# Patient Record
Sex: MALE | Race: WHITE
[De-identification: names, ages, dates, MRNs, and addresses within clinical notes are randomized per-mention and may not be internally consistent; named-entity substitution may affect disease eponyms.]

---

## 2018-01-27 ENCOUNTER — HOSPITAL ENCOUNTER (EMERGENCY)
Dept: HOSPITAL 72 - EMR | Age: 68
LOS: 1 days | Discharge: HOME | End: 2018-01-28
Payer: MEDICARE

## 2018-01-27 VITALS — SYSTOLIC BLOOD PRESSURE: 95 MMHG | DIASTOLIC BLOOD PRESSURE: 49 MMHG

## 2018-01-27 VITALS — WEIGHT: 203 LBS | BODY MASS INDEX: 26.05 KG/M2 | HEIGHT: 74 IN

## 2018-01-27 VITALS — DIASTOLIC BLOOD PRESSURE: 52 MMHG | SYSTOLIC BLOOD PRESSURE: 95 MMHG

## 2018-01-27 DIAGNOSIS — R19.7: ICD-10-CM

## 2018-01-27 DIAGNOSIS — J44.9: ICD-10-CM

## 2018-01-27 DIAGNOSIS — E11.9: ICD-10-CM

## 2018-01-27 DIAGNOSIS — G40.909: ICD-10-CM

## 2018-01-27 DIAGNOSIS — I10: ICD-10-CM

## 2018-01-27 DIAGNOSIS — R50.9: Primary | ICD-10-CM

## 2018-01-27 LAB
ADD MANUAL DIFF: YES
ALBUMIN SERPL-MCNC: 3.1 G/DL (ref 3.4–5)
ALBUMIN/GLOB SERPL: 1 {RATIO} (ref 1–2.7)
ALP SERPL-CCNC: 49 U/L (ref 46–116)
ALT SERPL-CCNC: 27 U/L (ref 12–78)
ANION GAP SERPL CALC-SCNC: 7 MMOL/L (ref 5–15)
APPEARANCE UR: CLEAR
APTT PPP: YELLOW S
AST SERPL-CCNC: 26 U/L (ref 15–37)
BILIRUB SERPL-MCNC: 0.5 MG/DL (ref 0.2–1)
BUN SERPL-MCNC: 29 MG/DL (ref 7–18)
CALCIUM SERPL-MCNC: 8 MG/DL (ref 8.5–10.1)
CHLORIDE SERPL-SCNC: 106 MMOL/L (ref 98–107)
CO2 SERPL-SCNC: 28 MMOL/L (ref 21–32)
CREAT SERPL-MCNC: 1.3 MG/DL (ref 0.55–1.3)
ERYTHROCYTE [DISTWIDTH] IN BLOOD BY AUTOMATED COUNT: 12.3 % (ref 11.6–14.8)
GLOBULIN SER-MCNC: 3 G/DL
GLUCOSE UR STRIP-MCNC: NEGATIVE MG/DL
HCT VFR BLD CALC: 37.2 % (ref 42–52)
HGB BLD-MCNC: 12.4 G/DL (ref 14.2–18)
KETONES UR QL STRIP: (no result)
LEUKOCYTE ESTERASE UR QL STRIP: (no result)
MCV RBC AUTO: 93 FL (ref 80–99)
NITRITE UR QL STRIP: NEGATIVE
PH UR STRIP: 5 [PH] (ref 4.5–8)
PLATELET # BLD: 95 K/UL (ref 150–450)
POTASSIUM SERPL-SCNC: 3.5 MMOL/L (ref 3.5–5.1)
PROT UR QL STRIP: (no result)
RBC # BLD AUTO: 4 M/UL (ref 4.7–6.1)
SODIUM SERPL-SCNC: 141 MMOL/L (ref 136–145)
SP GR UR STRIP: 1.01 (ref 1–1.03)
UROBILINOGEN UR-MCNC: NORMAL MG/DL (ref 0–1)
WBC # BLD AUTO: 3.2 K/UL (ref 4.8–10.8)

## 2018-01-27 PROCEDURE — 86710 INFLUENZA VIRUS ANTIBODY: CPT

## 2018-01-27 PROCEDURE — 80053 COMPREHEN METABOLIC PANEL: CPT

## 2018-01-27 PROCEDURE — 36415 COLL VENOUS BLD VENIPUNCTURE: CPT

## 2018-01-27 PROCEDURE — 99283 EMERGENCY DEPT VISIT LOW MDM: CPT

## 2018-01-27 PROCEDURE — 85007 BL SMEAR W/DIFF WBC COUNT: CPT

## 2018-01-27 PROCEDURE — 71045 X-RAY EXAM CHEST 1 VIEW: CPT

## 2018-01-27 PROCEDURE — 85025 COMPLETE CBC W/AUTO DIFF WBC: CPT

## 2018-01-27 PROCEDURE — 81003 URINALYSIS AUTO W/O SCOPE: CPT

## 2018-01-27 NOTE — EMERGENCY ROOM REPORT
History of Present Illness


General


Chief Complaint:  Fever


Source:  Patient, Medical Record





Present Illness


HPI


67-year-old male sent from nursing home for episode of fever today, was given 

Tylenol prior to arrival.


Patient himself denies complaints


Endorses 2 days of diarrhea, twice a day, last episode was 5 hours ago.


no Blood in diarrhea, no recent antibiotic use


No history of diverticulitis or colitis.


No prior surgical history.


Also endorses dry cough intermittent for one week, no history of asthma or COPD.


Allergies:  


Coded Allergies:  


     No Known Allergies (Unverified , 1/27/18)





Patient History


Past Medical History:  DM, HTN


Past Surgical History:  none


Pertinent Family History:  none


Social History:  Denies: smoking, alcohol use, drug use


Immunizations:  UTD


Reviewed Nursing Documentation:  PMH: Agreed, PSxH: Agreed





Nursing Documentation-PMH


Past Medical History:  No History, Except For


Hx Hypertension:  Yes


Hx COPD:  Yes


Hx Diabetes:  Yes - dm2


Hx Seizures:  Yes - epilepsy





Review of Systems


All Other Systems:  negative except mentioned in HPI





Physical Exam





Vital Signs








  Date Time  Temp Pulse Resp B/P (MAP) Pulse Ox O2 Delivery O2 Flow Rate FiO2


 


1/27/18 22:04 99.3 90 16 84/50 96 Room Air  








Sp02 EP Interpretation:  reviewed, normal


General Appearance:  normal inspection, well appearing, no apparent distress, 

alert, GCS 15, non-toxic, other - Very well-appearing, reading Bible in 

stretcher


Head:  normocephalic, atraumatic


Eyes:  bilateral eye PERRL, bilateral eye EOMI


ENT:  normal ENT inspection, hearing grossly normal, normal pharynx, no 

angioedema, normal voice, TMs + canals normal, uvula midline, moist mucus 

membranes


Neck:  normal inspection, full range of motion, supple, thyroid normal, no 

meningismus, no bony tend


Respiratory:  normal inspection, lungs clear, normal breath sounds, no rhonchi, 

no respiratory distress, no retraction, no accessory muscle use, no wheezing, 

speaking full sentences


Cardiovascular #1:  regular rate, rhythm, no edema, no JVD, normal capillary 

refill


Gastrointestinal:  normal inspection, normal bowel sounds, non tender, soft, no 

mass, no peritonitis, non-distended, no guarding, no hernia, no pulsatile mass


Genitourinary:  no CVA tenderness


Musculoskeletal:  normal inspection, back normal, normal range of motion, no 

calf tenderness, pelvis stable, Miguelito's Sign negative


Neurologic:  normal inspection, alert, oriented x3, responsive, CNs III-XII nml 

as tested, motor strength/tone normal, cerebellar normal, normal gait, speech 

normal


Psychiatric:  normal inspection, judgement/insight normal, mood/affect normal, 

no suicidal/homicidal ideation, no delusions


Skin:  normal inspection, normal color, no rash


Lymphatic:  normal inspection, no adenopathy





Medical Decision Making


ER Course


VSS, very well appearing


Influenza negative


Chest x-ray negative for pneumonia


labs unremarkable


No additional episodes of diarrhea in the ER


No abdominal pain, nonfocal abdomen on serial exam


urinalysis also negative for infection





Will discharge back to [facility]








ER course:


Patient has remained stable during ED stay.





Disposition:





Patient is to be discharged to home.


Patient is instructed to follow up with their primary care doctor within 1-2 

days. 


Strict return precautions discussed with patient such as fever, chills, 

worsening/severe pain, nausea, vomiting, which may indicate severe illness. 

Patient verbalizes understanding and agrees with plan. 





Please note that this Emergency Department Report was dictated using [employer] technology software, occasionally this can lead to 

erroneous entry secondary to interpretation by the dictation equipment


Rhythm Strip Diag. Results


EP Interpretation:  yes


Rate:  77


Rhythm:  NSR, no PVC's, no ectopy





Chest X-Ray Diagnostic Results


Chest X-Ray Diagnostic Results :  


   Chest X-Ray Ordered:  Yes


   # of Views/Limited/Complete:  1 View


   Indication:  Other - cough


   EP Interpretation:  Yes


   Interpretation:  no consolidation, no effusion, no pneumothorax, no acute 

cardiopulmonary disease


   Impression:  No acute disease


   Electronically Signed by:  Dr Irasema Grissom MD





Last Vital Signs








  Date Time  Temp Pulse Resp B/P (MAP) Pulse Ox O2 Delivery O2 Flow Rate FiO2


 


1/27/18 22:31 98.4 78 16 95/52 96 Room Air  








Status:  improved


Disposition:  ASSISTED LIVING











IRASEMA GRISSOM M.D. Jan 27, 2018 22:46

## 2018-01-28 VITALS — SYSTOLIC BLOOD PRESSURE: 99 MMHG | DIASTOLIC BLOOD PRESSURE: 57 MMHG

## 2018-01-28 NOTE — DIAGNOSTIC IMAGING REPORT
Indication: Dyspnea

 

Technique: XRAY Chest 1v

 

Comparison: None

 

Findings: Heart size and mediastinal contours are within normal limits given

technique. There is no focal consolidation, pneumothorax or pleural effusion. Osseous

structures demonstrate no acute abnormality.

 

Impression: No radiographic evidence of acute cardiopulmonary disease.

## 2018-11-05 ENCOUNTER — HOSPITAL ENCOUNTER (INPATIENT)
Dept: HOSPITAL 72 - EMR | Age: 68
LOS: 7 days | Discharge: NURSING, REHAB OR LONG-TERM CARE | DRG: 417 | End: 2018-11-12
Payer: MEDICARE

## 2018-11-05 VITALS — DIASTOLIC BLOOD PRESSURE: 70 MMHG | SYSTOLIC BLOOD PRESSURE: 159 MMHG

## 2018-11-05 VITALS — WEIGHT: 204 LBS | BODY MASS INDEX: 26.18 KG/M2 | HEIGHT: 74 IN

## 2018-11-05 VITALS — SYSTOLIC BLOOD PRESSURE: 124 MMHG | DIASTOLIC BLOOD PRESSURE: 62 MMHG

## 2018-11-05 VITALS — SYSTOLIC BLOOD PRESSURE: 139 MMHG | DIASTOLIC BLOOD PRESSURE: 75 MMHG

## 2018-11-05 VITALS — SYSTOLIC BLOOD PRESSURE: 147 MMHG | DIASTOLIC BLOOD PRESSURE: 57 MMHG

## 2018-11-05 DIAGNOSIS — K29.51: ICD-10-CM

## 2018-11-05 DIAGNOSIS — R62.7: ICD-10-CM

## 2018-11-05 DIAGNOSIS — I25.10: ICD-10-CM

## 2018-11-05 DIAGNOSIS — G20: ICD-10-CM

## 2018-11-05 DIAGNOSIS — J44.9: ICD-10-CM

## 2018-11-05 DIAGNOSIS — D61.818: ICD-10-CM

## 2018-11-05 DIAGNOSIS — K81.0: Primary | ICD-10-CM

## 2018-11-05 DIAGNOSIS — E11.40: ICD-10-CM

## 2018-11-05 DIAGNOSIS — K44.9: ICD-10-CM

## 2018-11-05 DIAGNOSIS — F25.0: ICD-10-CM

## 2018-11-05 DIAGNOSIS — K91.5: ICD-10-CM

## 2018-11-05 DIAGNOSIS — I10: ICD-10-CM

## 2018-11-05 LAB
ADD MANUAL DIFF: YES
ALBUMIN SERPL-MCNC: 3.9 G/DL (ref 3.4–5)
ALBUMIN/GLOB SERPL: 1 {RATIO} (ref 1–2.7)
ALP SERPL-CCNC: 159 U/L (ref 46–116)
ALT SERPL-CCNC: 465 U/L (ref 12–78)
ANION GAP SERPL CALC-SCNC: 13 MMOL/L (ref 5–15)
APPEARANCE UR: CLEAR
APTT BLD: 25 SEC (ref 23–33)
APTT PPP: (no result) S
AST SERPL-CCNC: 202 U/L (ref 15–37)
BILIRUB SERPL-MCNC: 0.6 MG/DL (ref 0.2–1)
BUN SERPL-MCNC: 21 MG/DL (ref 7–18)
CALCIUM SERPL-MCNC: 9.6 MG/DL (ref 8.5–10.1)
CHLORIDE SERPL-SCNC: 103 MMOL/L (ref 98–107)
CK SERPL-CCNC: 142 U/L (ref 26–308)
CO2 SERPL-SCNC: 26 MMOL/L (ref 21–32)
CREAT SERPL-MCNC: 1.2 MG/DL (ref 0.55–1.3)
ERYTHROCYTE [DISTWIDTH] IN BLOOD BY AUTOMATED COUNT: 11 % (ref 11.6–14.8)
GLOBULIN SER-MCNC: 4.1 G/DL
GLUCOSE UR STRIP-MCNC: (no result) MG/DL
HCT VFR BLD CALC: 41.8 % (ref 42–52)
HGB BLD-MCNC: 14.8 G/DL (ref 14.2–18)
INR PPP: 1 (ref 0.9–1.1)
KETONES UR QL STRIP: (no result)
LEUKOCYTE ESTERASE UR QL STRIP: NEGATIVE
MCV RBC AUTO: 89 FL (ref 80–99)
NITRITE UR QL STRIP: NEGATIVE
PH UR STRIP: 9 [PH] (ref 4.5–8)
PLATELET # BLD: 159 K/UL (ref 150–450)
POTASSIUM SERPL-SCNC: 4.1 MMOL/L (ref 3.5–5.1)
PROT UR QL STRIP: (no result)
RBC # BLD AUTO: 4.69 M/UL (ref 4.7–6.1)
SODIUM SERPL-SCNC: 141 MMOL/L (ref 136–145)
SP GR UR STRIP: 1.01 (ref 1–1.03)
UROBILINOGEN UR-MCNC: NORMAL MG/DL (ref 0–1)
WBC # BLD AUTO: 7.8 K/UL (ref 4.8–10.8)

## 2018-11-05 PROCEDURE — 80053 COMPREHEN METABOLIC PANEL: CPT

## 2018-11-05 PROCEDURE — 85610 PROTHROMBIN TIME: CPT

## 2018-11-05 PROCEDURE — 82962 GLUCOSE BLOOD TEST: CPT

## 2018-11-05 PROCEDURE — 86803 HEPATITIS C AB TEST: CPT

## 2018-11-05 PROCEDURE — 86705 HEP B CORE ANTIBODY IGM: CPT

## 2018-11-05 PROCEDURE — 81003 URINALYSIS AUTO W/O SCOPE: CPT

## 2018-11-05 PROCEDURE — 83540 ASSAY OF IRON: CPT

## 2018-11-05 PROCEDURE — 76700 US EXAM ABDOM COMPLETE: CPT

## 2018-11-05 PROCEDURE — 74177 CT ABD & PELVIS W/CONTRAST: CPT

## 2018-11-05 PROCEDURE — 82550 ASSAY OF CK (CPK): CPT

## 2018-11-05 PROCEDURE — 83550 IRON BINDING TEST: CPT

## 2018-11-05 PROCEDURE — 96361 HYDRATE IV INFUSION ADD-ON: CPT

## 2018-11-05 PROCEDURE — 71045 X-RAY EXAM CHEST 1 VIEW: CPT

## 2018-11-05 PROCEDURE — 86901 BLOOD TYPING SEROLOGIC RH(D): CPT

## 2018-11-05 PROCEDURE — 36415 COLL VENOUS BLD VENIPUNCTURE: CPT

## 2018-11-05 PROCEDURE — 83690 ASSAY OF LIPASE: CPT

## 2018-11-05 PROCEDURE — 78266 GSTR EMPT IMG SM BWL&COLON: CPT

## 2018-11-05 PROCEDURE — 85025 COMPLETE CBC W/AUTO DIFF WBC: CPT

## 2018-11-05 PROCEDURE — 82607 VITAMIN B-12: CPT

## 2018-11-05 PROCEDURE — 94150 VITAL CAPACITY TEST: CPT

## 2018-11-05 PROCEDURE — 82728 ASSAY OF FERRITIN: CPT

## 2018-11-05 PROCEDURE — 84484 ASSAY OF TROPONIN QUANT: CPT

## 2018-11-05 PROCEDURE — 87340 HEPATITIS B SURFACE AG IA: CPT

## 2018-11-05 PROCEDURE — 82150 ASSAY OF AMYLASE: CPT

## 2018-11-05 PROCEDURE — 86900 BLOOD TYPING SEROLOGIC ABO: CPT

## 2018-11-05 PROCEDURE — 96375 TX/PRO/DX INJ NEW DRUG ADDON: CPT

## 2018-11-05 PROCEDURE — 84100 ASSAY OF PHOSPHORUS: CPT

## 2018-11-05 PROCEDURE — 82746 ASSAY OF FOLIC ACID SERUM: CPT

## 2018-11-05 PROCEDURE — 99285 EMERGENCY DEPT VISIT HI MDM: CPT

## 2018-11-05 PROCEDURE — 83735 ASSAY OF MAGNESIUM: CPT

## 2018-11-05 PROCEDURE — 80048 BASIC METABOLIC PNL TOTAL CA: CPT

## 2018-11-05 PROCEDURE — 87081 CULTURE SCREEN ONLY: CPT

## 2018-11-05 PROCEDURE — 97803 MED NUTRITION INDIV SUBSEQ: CPT

## 2018-11-05 PROCEDURE — 93005 ELECTROCARDIOGRAM TRACING: CPT

## 2018-11-05 PROCEDURE — 93970 EXTREMITY STUDY: CPT

## 2018-11-05 PROCEDURE — 94003 VENT MGMT INPAT SUBQ DAY: CPT

## 2018-11-05 PROCEDURE — 85007 BL SMEAR W/DIFF WBC COUNT: CPT

## 2018-11-05 PROCEDURE — 86709 HEPATITIS A IGM ANTIBODY: CPT

## 2018-11-05 PROCEDURE — 85730 THROMBOPLASTIN TIME PARTIAL: CPT

## 2018-11-05 PROCEDURE — 82270 OCCULT BLOOD FECES: CPT

## 2018-11-05 PROCEDURE — 87086 URINE CULTURE/COLONY COUNT: CPT

## 2018-11-05 PROCEDURE — 87040 BLOOD CULTURE FOR BACTERIA: CPT

## 2018-11-05 PROCEDURE — 86850 RBC ANTIBODY SCREEN: CPT

## 2018-11-05 PROCEDURE — 96374 THER/PROPH/DIAG INJ IV PUSH: CPT

## 2018-11-05 RX ADMIN — INSULIN ASPART SCH UNITS: 100 INJECTION, SOLUTION INTRAVENOUS; SUBCUTANEOUS at 17:15

## 2018-11-05 RX ADMIN — DIVALPROEX SODIUM SCH MG: 500 TABLET, FILM COATED, EXTENDED RELEASE ORAL at 20:27

## 2018-11-05 RX ADMIN — TAMSULOSIN HYDROCHLORIDE SCH MG: 0.4 CAPSULE ORAL at 20:27

## 2018-11-05 RX ADMIN — INSULIN ASPART SCH UNITS: 100 INJECTION, SOLUTION INTRAVENOUS; SUBCUTANEOUS at 20:29

## 2018-11-05 RX ADMIN — ZIPRASIDONE HYDROCHLORIDE SCH MG: 20 CAPSULE ORAL at 17:10

## 2018-11-05 RX ADMIN — DEXTROSE AND SODIUM CHLORIDE SCH MLS/HR: 5; .45 INJECTION, SOLUTION INTRAVENOUS at 15:19

## 2018-11-05 NOTE — EMERGENCY ROOM REPORT
History of Present Illness


General


Chief Complaint:  Abdominal Pain


Source:  Patient, Medical Record, EMS





Present Illness


HPI


Patient presents with vomiting and abdominal pain.  This is been worsening over 

the last 3 days.  He now points to his right lower quadrant with pain is worse 

right now he rates it 7/10 and achy pressure.  He's been vomiting dark material 

also and having difficulty keeping anything down.  





The patient's been moving his bowels without difficulty.  He denies any melena.





The patient has a history of either Parkinson's or neuropathy in the back of 

his brain with atrophy and tremors from taking psychiatric medication.  He has 

chronic weakness.





H/O seizures.  H/O diabetes.  H/O bipolar disorder





No cough, sore throat, rashes, joint pain, headache, anxiety.


Allergies:  


Coded Allergies:  


     No Known Allergies (Unverified , 1/27/18)





Patient History


Past Medical History:  see triage record


Social History:  Denies: smoking - former


Social History Narrative


assisted living


Reviewed Nursing Documentation:  PMH: Agreed; PSxH: Agreed





Nursing Documentation-PMH


Past Medical History:  No History, Except For


Hx Hypertension:  Yes


Hx COPD:  Yes


Hx Diabetes:  Yes


Hx Cancer:  No


Hx Gastrointestinal Problems:  No - gerd


Hx Neurological Problems:  Yes - PARKINSONS


Hx Seizures:  Yes





Review of Systems


All Other Systems:  negative except mentioned in HPI





Physical Exam





Vital Signs








  Date Time  Temp Pulse Resp B/P (MAP) Pulse Ox O2 Delivery O2 Flow Rate FiO2


 


11/5/18 09:16 98.2 73 22 162/70 97 Room Air  








Sp02 EP Interpretation:  reviewed, normal


General Appearance:  well appearing, no apparent distress, GCS 15


Head:  normocephalic


Eyes:  bilateral eye normal inspection, bilateral eye PERRL


ENT:  moist mucus membranes


Neck:  supple


Respiratory:  lungs clear, normal breath sounds


Cardiovascular #1:  regular rate, rhythm


Cardiovascular #2:  2+ radial (R)


Gastrointestinal:  normal inspection, normal bowel sounds, no mass, non-

distended, no guarding, no rebound, tenderness - RLQ


Rectal:  other - vomit Heme +


Musculoskeletal:  back normal, gait/station normal, normal range of motion


Neurologic:  alert, oriented x3, CNs III-XII nml as tested, motor strength/tone 

normal, DTRs symmetric, sensory intact, speech normal, other - tremor, thenar 

atrophy


Skin:  normal inspection, warm/dry





Medical Decision Making


Diagnostic Impression:  


 Primary Impression:  


 Abdominal pain


 Qualified Codes:  R10.31 - Right lower quadrant pain


 Additional Impressions:  


 UGI bleed


 Elevated LFTs


ER Course


Patient presents with 3 days of abdominal pain and vomiting.  His vomiting 

guaiac Positive material.  Differential includes gastritis, gastroenteritis 

appendicitis pancreatitis, peptic ulcer disease amongst others.  Evaluation 

will be with EKG, labs and CT the abdomen.  The patient will be treated with IV 

hydration, Pepcid, Zofran and morphine.





EKG with normal sinus rhythm normal EKG rate of 75 normal intervals.  WBC 

normal but L shift.  CMP with elevated LFTs.  UA with pyuria. 





Antibiotics begun.





CT with possible cholecystitis.  





Physical exam against cholecystitis at this time.  Pain free after analgesia.  

Still needs admission for further work up.





Admit med Dr. Henderson.





Laboratory Tests








Test


  11/5/18


09:57 11/5/18


10:50 11/6/18


05:55


 


White Blood Count


  7.8 K/UL


(4.8-10.8) 


  7.8 K/UL


(4.8-10.8)


 


Red Blood Count


  4.69 M/UL


(4.70-6.10)  L 


  4.27 M/UL


(4.70-6.10)  L


 


Hemoglobin


  14.8 G/DL


(14.2-18.0) 


  13.2 G/DL


(14.2-18.0)  L


 


Hematocrit


  41.8 %


(42.0-52.0)  L 


  38.3 %


(42.0-52.0)  L


 


Mean Corpuscular Volume 89 FL (80-99)    90 FL (80-99)  


 


Mean Corpuscular Hemoglobin


  31.5 PG


(27.0-31.0)  H 


  30.9 PG


(27.0-31.0)


 


Mean Corpuscular Hemoglobin


Concent 35.4 G/DL


(32.0-36.0) 


  34.3 G/DL


(32.0-36.0)


 


Red Cell Distribution Width


  11.0 %


(11.6-14.8)  L 


  11.4 %


(11.6-14.8)  L


 


Platelet Count


  159 K/UL


(150-450) 


  129 K/UL


(150-450)  L


 


Mean Platelet Volume


  6.5 FL


(6.5-10.1) 


  5.8 FL


(6.5-10.1)  L


 


Neutrophils (%) (Auto)


  % (45.0-75.0)


  


  76.7 %


(45.0-75.0)  H


 


Lymphocytes (%) (Auto)


  % (20.0-45.0)


  


  9.9 %


(20.0-45.0)  L


 


Monocytes (%) (Auto)


   % (1.0-10.0)  


  


  12.1 %


(1.0-10.0)  H


 


Eosinophils (%) (Auto)


   % (0.0-3.0)  


  


  0.6 %


(0.0-3.0)


 


Basophils (%) (Auto)


   % (0.0-2.0)  


  


  0.7 %


(0.0-2.0)


 


Differential Total Cells


Counted 100  


  


  


 


 


Neutrophils % (Manual) 92 % (45-75)  H  


 


Lymphocytes % (Manual) 2 % (20-45)  L  


 


Monocytes % (Manual) 6 % (1-10)    


 


Eosinophils % (Manual) 0 % (0-3)    


 


Basophils % (Manual) 0 % (0-2)    


 


Band Neutrophils 0 % (0-8)    


 


Platelet Estimate Adequate    


 


Platelet Morphology Normal    


 


Anisocytosis 1+    


 


Prothrombin Time


  10.1 SEC


(9.30-11.50) 


  10.1 SEC


(9.30-11.50)


 


Prothrombin Time INR 1.0 (0.9-1.1)    1.0 (0.9-1.1)  


 


PTT


  25 SEC (23-33)


  


  26 SEC (23-33)


 


 


Sodium Level


  141 MMOL/L


(136-145) 


  140 MMOL/L


(136-145)


 


Potassium Level


  4.1 MMOL/L


(3.5-5.1) 


  4.1 MMOL/L


(3.5-5.1)


 


Chloride Level


  103 MMOL/L


() 


  108 MMOL/L


()  H


 


Carbon Dioxide Level


  26 MMOL/L


(21-32) 


  29 MMOL/L


(21-32)


 


Anion Gap


  13 mmol/L


(5-15) 


  3 mmol/L


(5-15)  L


 


Blood Urea Nitrogen


  21 mg/dL


(7-18)  H 


  16 mg/dL


(7-18)


 


Creatinine


  1.2 MG/DL


(0.55-1.30) 


  0.9 MG/DL


(0.55-1.30)


 


Estimate Glomerular


Filtration Rate > 60 mL/min


(>60) 


  > 60 mL/min


(>60)


 


Glucose Level


  189 MG/DL


()  H 


  161 MG/DL


()  H


 


Calcium Level


  9.6 MG/DL


(8.5-10.1) 


  8.4 MG/DL


(8.5-10.1)  L


 


Total Bilirubin


  0.6 MG/DL


(0.2-1.0) 


  0.5 MG/DL


(0.2-1.0)


 


Aspartate Amino Transferase


(AST) 202 U/L


(15-37)  H 


  68 U/L (15-37)


H


 


Alanine Aminotransferase (ALT)


  465 U/L


(12-78)  H 


  248 U/L


(12-78)  H


 


Alkaline Phosphatase


  159 U/L


()  H 


  112 U/L


()


 


Total Creatine Kinase


  142 U/L


() 


  


 


 


Troponin I


  0.003 ng/mL


(0.000-0.056) 


  


 


 


Total Protein


  8.0 G/DL


(6.4-8.2) 


  6.9 G/DL


(6.4-8.2)


 


Albumin


  3.9 G/DL


(3.4-5.0) 


  3.1 G/DL


(3.4-5.0)  L


 


Globulin 4.1 g/dL    3.8 g/dL  


 


Albumin/Globulin Ratio


  1.0 (1.0-2.7)  


  


  0.8 (1.0-2.7)


L


 


Lipase


  109 U/L


() 


  75 U/L


()


 


Urine Color  Pale yellow   


 


Urine Appearance  Clear   


 


Urine pH  9 (4.5-8.0)   


 


Urine Specific Gravity


  


  1.015


(1.005-1.035) 


 


 


Urine Protein


  


  2+ (NEGATIVE)


H 


 


 


Urine Glucose (UA)


  


  1+ (NEGATIVE)


H 


 


 


Urine Ketones


  


  3+ (NEGATIVE)


H 


 


 


Urine Blood


  


  1+ (NEGATIVE)


H 


 


 


Urine Nitrite


  


  Negative


(NEGATIVE) 


 


 


Urine Bilirubin


  


  Negative


(NEGATIVE) 


 


 


Urine Urobilinogen


  


  Normal MG/DL


(0.0-1.0) 


 


 


Urine Leukocyte Esterase


  


  Negative


(NEGATIVE) 


 


 


Urine RBC


  


  2-4 /HPF (0 -


0)  H 


 


 


Urine WBC


  


  0-2 /HPF (0 -


0) 


 


 


Urine Squamous Epithelial


Cells 


  Occasional


/LPF 


 


 


Urine Amorphous Sediment


  


  Few /LPF


(NONE)  H 


 


 


Urine Bacteria


  


  Occasional


/HPF (NONE) 


 


 


Amylase Level


  


  


  45 U/L


()


 


Hepatitis A IgM Antibody   Pending  


 


Hepatitis B Surface Antigen   Pending  


 


Hepatitis B Core IgM Antibody   Pending  


 


Hepatitis C Antibody   Pending  








EKG Diagnostic Results


Rate:  normal


Rhythm:  NSR


ST Segments:  no acute changes





Rhythm Strip Diag. Results


EP Interpretation:  yes


Rhythm:  NSR, no PVC's, no ectopy





Chest X-Ray Diagnostic Results


Chest X-Ray Diagnostic Results :  


   Chest X-Ray Ordered:  Yes


   Indication:  Other


   EP Interpretation:  Yes


   Interpretation:  no consolidation, no effusion, no pneumothorax


   Impression:  No acute disease


   Electronically Signed by:  Devante Andrade MD





CT/MRI/US Diagnostic Results


CT/MRI/US Diagnostic Results :  


   Imaging Test Ordered:  abd pelvis


   Impression


Cholelithiasis. Cholecystitis not excluded. Correlate clinically


Mild perinephric stranding nonspecific.


Atherosclerotic disease.


Calcification within the liver near the dome nonspecific in nature.


Small right inguinal hernia containing fat.


L4 spondylolysis. Grade 1 spondylolisthesis L4 on 5.





Last Vital Signs








  Date Time  Temp Pulse Resp B/P (MAP) Pulse Ox O2 Delivery O2 Flow Rate FiO2


 


11/5/18 10:13 98.0 72 15 159/70 100 Room Air  








Status:  improved


Disposition:  ADMITTED AS INPATIENT


Condition:  Serious


Referrals:  


Jean-Pierre Henderson DO (PCP)











Devante Andrade MD Nov 5, 2018 10:25

## 2018-11-05 NOTE — DIAGNOSTIC IMAGING REPORT
Indication: Dyspnea

 

Comparison:  1/27/2018

 

A single view chest radiograph was obtained.

 

Findings:

 

Cardiomediastinal appearance is within normal limits for age. The lungs are clear.

Pulmonary vascularity is appropriate. The diaphragmatic contour is smooth and

costophrenic angles are sharp. No pleural effusions are identified. Degenerative

changes of the thoracic spine noted.

 

Impression: No acute findings

## 2018-11-05 NOTE — HISTORY AND PHYSICAL REPORT
DATE OF ADMISSION:  11/05/2018

TIME SEEN:  On 11/05/2018 at 1 p.m.



CONSULTANTS:

1. Kavya Joshi M.D.

2. Curtis Elizondo M.D.

3. Helen Lujan M.D.



CHIEF COMPLAINT:  Abdominal pain and GI bleed.



BRIEF HISTORY:  This is a 68-year-old male from [facility], who presents with abdominal pain and vomiting blood.  He came to

[place] ER, diagnosed with GI bleed, admitted to medical floor for further

treatment.  Currently, calm in bed, slightly confused, no complaint.



REVIEW OF SYSTEMS:  No chest pain.  No shortness of breath.  Slight nausea.

Slight vomiting.  No diarrhea.



PAST MEDICAL HISTORY:  Diabetes, schizoaffective disorder, COPD, weakness,

Parkinson's, failure to thrive.



PAST SURGICAL HISTORY:  Left arm.



ALLERGIES:  Denies.



MEDICATIONS:  Include Norvasc, Proscar, Cozaar, Januvia, Seroquel, Geodon,

NovoLog, morphine, temazepam, and diphenhydramine.



SOCIAL HISTORY:  No smoking.  No alcohol.  No intravenous drug

abuse.



FAMILY HISTORY:  Noncontributory.



PHYSICAL EXAMINATION:

GENERAL:  Calm in bed, oriented x2, in no acute distress.

VITAL SIGNS:  Temperature is 98 degrees, pulse 75, respirations 17, blood

pressure 147/57.

CARDIOVASCULAR:  No murmur.

LUNGS:  Distant and clear.

ABDOMEN:  Bowel sounds positive.  Nontender.  Nondistended.

EXTREMITIES:  No cyanosis or edema.

NEUROLOGIC:  The patient moves all extremities, slightly weak.



LABORATORY AND DIAGNOSTIC DATA:  Labs at this time show CBC is normal.  BMP

shows BUN 21, glucose 189, , , alkaline phosphatase 159.

Troponin 0.003.  INR is 1.0, PTT is 25.  Urinalysis, 1+ blood, 3+ ketone,

otherwise normal.



ASSESSMENT:  GI bleed, abdominal pain, hypertension, diabetes, COPD,

weakness, Parkinson's, schizoaffective, and failure to thrive.



PLAN:  NPO.  IV fluid.  OT, PT, dietary evaluation.  Resume home

medications.  CBC and BMP in the morning.  Blood pressure and blood sugar

control.









  ______________________________________________

  Jean-Pierre Henderson D.O.





DR:  Ron

D:  11/05/2018 13:32

T:  11/05/2018 19:49

JOB#:  [id number]

CC:

## 2018-11-05 NOTE — GI INITIAL CONSULT NOTE
History of Present Illness


General


Date patient seen:  Nov 5, 2018


Time patient seen:  15:58


Reason for Hospitalization:  Abdominal Pain


Referring physician:  FABIANO RODRIGUEZ


Reason for Consultation:  UGIB





Present Illness


HPI


Patient presents with vomiting and abdominal pain.  This is been worsening over 

the last 3 days.  He now points to his right lower quadrant with pain is worse 

right now he rates it 7/10 and achy pressure.  He's been vomiting dark material 

also and having difficulty keeping anything down.  


The patient's been moving his bowels without difficulty.  He denies any melena.


The patient has a history of either Parkinson's or neuropathy in the back of 

his brain with atrophy and tremors from taking psychiatric medication.


GI consulted for reported of UGIB.  Pt seen, awake A&Ox4 NAD with no active s/

sx of N/V/D or constipation at this time.  Pt reported severe vomiting and 

nausea last night reported dark contents.  The patient has no history of 

endoscopy nor colonoscopy.  He has been a diabetic for over 20 years.  Presents 

today with no anemia, however noted with transaminitis with elevated alkaline 

phosphatase.


Home Meds


Reported Medications


Quetiapine Fumarate* (QUETIAPINE FUMARATE*) 50 Mg Tablet, 50 MG ORAL BID, TAB


   11/5/18


Sitagliptin* (JANUVIA*) 25 Mg Tablet, 100 MG ORAL DAILY, TAB


   11/5/18


Al Hydroxide/mg Hydroxide (Mag-Al Liquid) 30 Ml Oral.susp, 30 ML ORAL Q6HR PRN 

for INDIGESTION, ML


   7/18/18


Ziprasidone Hcl* (GEODON*) 20 Mg Capsule, 20 MG ORAL TWICE A DAY, CAP 0 Refills


   7/15/18


Tamsulosin Hcl (TAMSULOSIN HCL*) 0.4 Mg Cap.er.24h, 0.4 MG ORAL BEDTIME, CAP


   7/15/18


Losartan Potassium* (LOSARTAN POTASSIUM*) 50 Mg Tablet, 50 MG ORAL DAILY, TAB


   7/15/18


Finasteride* (PROSCAR*) 5 Mg Tablet, 5 MG ORAL DAILY, TAB 0 Refills


   7/15/18


Divalproex Sodium* (DEPAKOTE ER*) 500 Mg Tab.er.24h, 500 MG ORAL EVERY 12 HOURS

, TAB


   7/15/18


Amlodipine Besylate* (AMLODIPINE BESYLATE*) 5 Mg Tablet, 5 MG ORAL DAILY, TAB


   7/15/18


Discontinued Reported Medications


Zolpidem Tartrate* (AMBIEN*) 5 Mg Tablet, 5 MG ORAL BEDTIME PRN for Insomnia, 

TAB


   7/18/18


Polyethylene Glycol 3350* (MIRALAX*) 17 Gm Powd.pack, 17 GM ORAL HS PRN for 

Constipation, PACKET


   7/18/18


Lorazepam* (ATIVAN*) 1 Mg Tablet, 1 MG ORAL BID PRN for For Anxiety, TAB


   7/18/18


Insulin Aspart (NOVOLOG) 100 Unit/1 Ml Vial, AC+HS


   7/18/18


Acetaminophen* (ACETAMINOPHEN 325MG TABLET*) 325 Mg Tablet, 650 MG ORAL Q4H PRN 

for Mild Pain/Temp > 100.5, TAB


   7/18/18


Propranolol Hcl* (INDERAL*) 10 Mg Tablet, 10 MG ORAL THREE TIMES A DAY, TAB 0 

Refills


   7/15/18


Sennosides (SENNA) 8.6 Mg Tablet, 8.6 MG PO BEDTIME, TAB


   7/15/18


Ascorbic Acid* (ASCORBIC ACID*) 500 Mg Tablet, 500 MG ORAL DAILY, TAB


   7/15/18


Sitagliptin (Januvia) 100 Mg Tablet, 100 MG ORAL DAILY, TAB


   7/15/18


Quetiapine Fumarate* (QUETIAPINE FUMARATE*) 50 Mg Tablet, 50 MG ORAL BID, TAB


   7/15/18


Omeprazole (OMEPRAZOLE) 40 Mg Capsule.dr, 40 MG ORAL DAILY, CAP


   7/15/18


Metformin Hcl* (METFORMIN HCL*) 500 Mg Tablet, 500 MG ORAL TWICE A DAY, TAB


   7/15/18


Lithium Carbonate* (LITHIUM*) 300 Mg Capsule, 300 MG ORAL EVERY 8 HOURS, CAP 0 

Refills


   7/15/18


Levetiracetam (LEVETIRACETAM) 750 Mg Tab.er.24h, 750 MG ORAL BID, TAB 0 Refills


   7/15/18


Escitalopram Oxalate (ESCITALOPRAM OXALATE*) 20 Mg Tablet, 20 MG ORAL DAILY, 

TAB 0 Refills


   7/15/18


Docusate Sodium* (COLACE*) 100 Mg Capsule, 250 MG ORAL BEDTIME, CAP


   7/15/18


Aspirin* (ASPIRIN*) 325 Mg Tablet, 325 MG ORAL DAILY, TAB


   7/15/18


Med list reviewed/reconciled:  Yes


Allergies:  


Coded Allergies:  


     No Known Allergies (Unverified , 1/27/18)





Patient History


History Provided By:  Patient, Medical Record


PMH Narrative


Past Medical History:  No History, Except For


Hx Hypertension:  Yes


Hx COPD:  Yes


Hx Diabetes:  Yes


Hx Cancer:  No


Hx Gastrointestinal Problems:  No - gerd


Hx Neurological Problems:  Yes - PARKINSON


Hx Seizures:  Yes


Pertinent Family History:  none


Social History:  Denies: smoking, alcohol use, drug use, other





Review of Systems


All Other Systems:  negative except mentioned in HPI





Physical Exam





Vital Signs








  Date Time  Temp Pulse Resp B/P (MAP) Pulse Ox O2 Delivery O2 Flow Rate FiO2


 


11/5/18 09:16 98.2 73 22 162/70 97 Room Air  








Sp02 EP Interpretation:  reviewed, normal


Labs





Laboratory Tests








Test


  11/5/18


09:57 11/5/18


10:50


 


White Blood Count


  7.8 K/UL


(4.8-10.8) 


 


 


Red Blood Count


  4.69 M/UL


(4.70-6.10)  L 


 


 


Hemoglobin


  14.8 G/DL


(14.2-18.0) 


 


 


Hematocrit


  41.8 %


(42.0-52.0)  L 


 


 


Mean Corpuscular Volume 89 FL (80-99)   


 


Mean Corpuscular Hemoglobin


  31.5 PG


(27.0-31.0)  H 


 


 


Mean Corpuscular Hemoglobin


Concent 35.4 G/DL


(32.0-36.0) 


 


 


Red Cell Distribution Width


  11.0 %


(11.6-14.8)  L 


 


 


Platelet Count


  159 K/UL


(150-450) 


 


 


Mean Platelet Volume


  6.5 FL


(6.5-10.1) 


 


 


Neutrophils (%) (Auto)


  % (45.0-75.0)


  


 


 


Lymphocytes (%) (Auto)


  % (20.0-45.0)


  


 


 


Monocytes (%) (Auto)  % (1.0-10.0)   


 


Eosinophils (%) (Auto)  % (0.0-3.0)   


 


Basophils (%) (Auto)  % (0.0-2.0)   


 


Differential Total Cells


Counted 100  


  


 


 


Neutrophils % (Manual) 92 % (45-75)  H 


 


Lymphocytes % (Manual) 2 % (20-45)  L 


 


Monocytes % (Manual) 6 % (1-10)   


 


Eosinophils % (Manual) 0 % (0-3)   


 


Basophils % (Manual) 0 % (0-2)   


 


Band Neutrophils 0 % (0-8)   


 


Platelet Estimate Adequate   


 


Platelet Morphology Normal   


 


Anisocytosis 1+   


 


Prothrombin Time


  10.1 SEC


(9.30-11.50) 


 


 


Prothromb Time International


Ratio 1.0 (0.9-1.1)  


  


 


 


Activated Partial


Thromboplast Time 25 SEC (23-33)


  


 


 


Sodium Level


  141 MMOL/L


(136-145) 


 


 


Potassium Level


  4.1 MMOL/L


(3.5-5.1) 


 


 


Chloride Level


  103 MMOL/L


() 


 


 


Carbon Dioxide Level


  26 MMOL/L


(21-32) 


 


 


Anion Gap


  13 mmol/L


(5-15) 


 


 


Blood Urea Nitrogen


  21 mg/dL


(7-18)  H 


 


 


Creatinine


  1.2 MG/DL


(0.55-1.30) 


 


 


Estimat Glomerular Filtration


Rate > 60 mL/min


(>60) 


 


 


Glucose Level


  189 MG/DL


()  H 


 


 


Calcium Level


  9.6 MG/DL


(8.5-10.1) 


 


 


Total Bilirubin


  0.6 MG/DL


(0.2-1.0) 


 


 


Aspartate Amino Transf


(AST/SGOT) 202 U/L


(15-37)  H 


 


 


Alanine Aminotransferase


(ALT/SGPT) 465 U/L


(12-78)  H 


 


 


Alkaline Phosphatase


  159 U/L


()  H 


 


 


Total Creatine Kinase


  142 U/L


() 


 


 


Troponin I


  0.003 ng/mL


(0.000-0.056) 


 


 


Total Protein


  8.0 G/DL


(6.4-8.2) 


 


 


Albumin


  3.9 G/DL


(3.4-5.0) 


 


 


Globulin 4.1 g/dL   


 


Albumin/Globulin Ratio 1.0 (1.0-2.7)   


 


Lipase


  109 U/L


() 


 


 


Urine Color  Pale yellow  


 


Urine Appearance  Clear  


 


Urine pH  9 (4.5-8.0)  


 


Urine Specific Gravity


  


  1.015


(1.005-1.035)


 


Urine Protein


  


  2+ (NEGATIVE)


H


 


Urine Glucose (UA)


  


  1+ (NEGATIVE)


H


 


Urine Ketones


  


  3+ (NEGATIVE)


H


 


Urine Blood


  


  1+ (NEGATIVE)


H


 


Urine Nitrite


  


  Negative


(NEGATIVE)


 


Urine Bilirubin


  


  Negative


(NEGATIVE)


 


Urine Urobilinogen


  


  Normal MG/DL


(0.0-1.0)


 


Urine Leukocyte Esterase


  


  Negative


(NEGATIVE)


 


Urine RBC


  


  2-4 /HPF (0 -


0)  H


 


Urine WBC


  


  0-2 /HPF (0 -


0)


 


Urine Squamous Epithelial


Cells 


  Occasional


/LPF


 


Urine Amorphous Sediment


  


  Few /LPF


(NONE)  H


 


Urine Bacteria


  


  Occasional


/HPF (NONE)








General Appearance:  well appearing, no apparent distress, alert


Head:  normocephalic


EENT:  PERRL/EOMI, normal ENT inspection


Neck:  supple


Respiratory:  normal breath sounds, no respiratory distress


Cardiovascular:  normal rate


Gastrointestinal:  normal inspection, non tender, soft, normal bowel sounds, non

-distended


Rectal:  deferred


Genitourinary:  deferred


Musculoskeletal:  normal inspection, back normal


Neurologic:  normal inspection, alert, oriented x3, responsive


Psychiatric:  normal inspection, judgement/insight normal, memory normal


Skin:  normal inspection, normal color, no rash, warm/dry, palpation normal, 

well hydrated


Lymphatic:  normal inspection, no adenopathy


Current Medications





Current Medications








 Medications


  (Trade)  Dose


 Ordered  Sig/Erasmo


 Route


 PRN Reason  Start Time


 Stop Time Status Last Admin


Dose Admin


 


 Acetaminophen


  (Tylenol)  650 mg  Q4H  PRN


 ORAL


 fever  11/5/18 13:00


 12/5/18 12:59   


 


 


 Al Hydroxide/Mg


 Hydroxide


  (Mylanta II)  30 ml  Q6H  PRN


 ORAL


 dyspepsia  11/5/18 13:00


 12/5/18 12:59   


 


 


 Amlodipine


 Besylate


  (Norvasc)  5 mg  DAILY


 ORAL


   11/6/18 09:00


 12/6/18 08:59   


 


 


 Barium Sulfate


  (Readi-Cat 2)  450 ml  NOW  PRN


 ORAL


 Radiology Procedure  11/5/18 10:00


 11/7/18 09:47   


 


 


 Dextrose


  (Dextrose 50%)  25 ml  Q30M  PRN


 IV


 Hypoglycemia  11/5/18 13:15


 12/5/18 13:06   


 


 


 Dextrose


  (Dextrose 50%)  50 ml  Q30M  PRN


 IV


 hypoglycemia  11/5/18 13:15


 12/5/18 13:14   


 


 


 Dextrose/Sodium


 Chloride  1,000 ml @ 


 75 mls/hr  [id number]


 IV


   11/5/18 13:04


 12/5/18 13:03  11/5/18 15:19


 


 


 Diphenhydramine


 HCl


  (Benadryl)  25 mg  Q6H  PRN


 ORAL


 Itching/Pruritis  11/5/18 13:00


 12/5/18 12:59   


 


 


 Divalproex Sodium


  (Depakote ER)  500 mg  EVERY 12  HOURS


 ORAL


   11/5/18 21:00


 12/5/18 20:59   


 


 


 Finasteride


  (Proscar)  5 mg  DAILY


 ORAL


   11/6/18 09:00


 12/6/18 08:59   


 


 


 Insulin Aspart


  (NovoLOG)    BEFORE MEALS AND  HS


 SUBQ


   11/5/18 16:30


 12/5/18 16:29   


 


 


 Iopamidol


  (Isovue-300


 100ml)  100 ml  NOW  PRN


 INJ


 Radiology Procedure  11/5/18 10:00


     


 


 


 Losartan Potassium


  (Cozaar)  50 mg  DAILY


 ORAL


   11/6/18 09:00


 12/6/18 08:59   


 


 


 Morphine Sulfate


  (Morphine


 Sulfate)  2 mg  Q4H  PRN


 IVP


 severe  Pain (Pain Scale 7-10)  11/5/18 13:00


 11/12/18 12:59   


 


 


 Nitroglycerin


  (Ntg)  0.4 mg  Q5M X 3 DOSES PRN


 SL


 Prn Chest Pain  11/5/18 13:00


 12/5/18 12:59   


 


 


 Ondansetron HCl


  (Zofran)  4 mg  Q6H  PRN


 IVP


 Nausea & Vomiting  11/5/18 13:00


 12/5/18 12:59   


 


 


 Polyethylene


 Glycol


  (Miralax)  17 gm  HSPRN  PRN


 ORAL


 Constipation  11/5/18 13:00


 12/5/18 12:59   


 


 


 Quetiapine


 Fumarate


  (SEROquel)  50 mg  BID


 ORAL


   11/5/18 18:00


 12/5/18 17:59   


 


 


 Sitagliptin


 Phosphate


  (Januvia)  100 mg  DAILY


 ORAL


   11/6/18 09:00


 12/6/18 08:59   


 


 


 Temazepam


  (Restoril)  15 mg  HSPRN  PRN


 ORAL


 Insomnia  11/5/18 13:00


 11/12/18 12:59   


 


 


 Ziprasidone


  (Geodon)  20 mg  TWICE A  DAY


 ORAL


   11/5/18 18:00


 12/5/18 17:59   


 











GI: Plan


Problems:  


(1) UGI bleed


(2) Abdominal pain


(3) Failure to thrive


(4) Weak


Plan


EGD scheduled tomorrow.


- NPO @ [facility].


- hold all blood thinners


prn transfusions


ppi


will follow with additional recs post procedure.


fu labs


needs outpatient colonoscopy





Discussed with Dr. Elizondo.


Thank you for this patient referral, we will follow.





The patient was seen and examined at bedside and all new and available data was 

reviewed in the patients chart. I agree with the above findings, impression 

and plan.  (Patient seen earlier today. Signature stamp does not reflect 

patient encounter time.). - MD Karen SmallSentara Alb[facility] Centeroi NP Nov 5, 2018 15:49

## 2018-11-05 NOTE — DIAGNOSTIC IMAGING REPORT
Indication:Abdominal pain

 

Technique: Grayscale and duplex Doppler imaging of the abdomen performed.

 

Comparison: None

 

Findings:

 

The liver is unremarkable. Shadowing focus calcification noted in the dome of the

liver. The gallbladder stone demonstrated. Sonographic Magaña's sign is negative per

technologist. CBD measures approximately 7 mm. The demonstrated part of the pancreas,

aorta and IVC show no abnormalities.   Both kidneys appear unremarkable.  The spleen

is normal in size. There is no biliary ductal dilatation identified. Doppler

evaluation of the main portal vein shows patency. There is no ascites. No

hydronephrosis seen.

 

Impression:

 

Cholelithiasis.

 

Liver calcification.

 

Left renal cyst

## 2018-11-05 NOTE — DIAGNOSTIC IMAGING REPORT
Indication: Abdominal pain

 

Technique: Continuous helical transaxial imaging of the abdomen and pelvis was

obtained from the lung bases to the pubic symphysis during intravenous contrast

administration. Coronal 2-D reformats were also obtained. Study obtained in a Siemens

sensation 64 slice CT.  Automatic Exposure Control was utilized.

 

Total Dose length Product (DLP):  920.32 mGycm

 

CT Dose Index Volume (CTDIvol):   16.14 mGy

 

Comparison: None

 

Findings: Lung bases are clear. Prominent gallstone demonstrated. There may be mild

gallbladder wall thickening. Correlate clinically for cholecystitis. Biliary ducts

are unremarkable. The pancreas, spleen and adrenal glands are unremarkable. There is

a peripheral focus of calcification within the liver which is otherwise unremarkable.

Perinephric stranding noted mild in degree nonspecific. Moderate aortoiliac

calcifications are present. No free fluid or free air identified. Urinary bladder is

mildly distended. There is a small right inguinal hernia containing fat. There is

narrowing of intervertebral discs and accompanying endplate osteophyte formation. 

Hypertrophied facet joints also demonstrated.. Mild anterolisthesis demonstrated at

L4-5. Bilateral pars interarticularis defects are demonstrated at L4.

 

IMPRESSION:

 

Cholelithiasis. Cholecystitis not excluded. Correlate clinically

 

Mild perinephric stranding nonspecific.

 

Atherosclerotic disease.

 

Calcification within the liver near the dome nonspecific in nature.

 

Small right inguinal hernia containing fat.

 

L4 spondylolysis. Grade 1 spondylolisthesis L4 on 5.

 

 

 

 

The CT scanner at [facility] is accredited by the American College of

Radiology and the scans are performed using dose optimization techniques as

appropriate to a performed exam including Automatic Exposure control.

## 2018-11-06 VITALS — DIASTOLIC BLOOD PRESSURE: 79 MMHG | SYSTOLIC BLOOD PRESSURE: 151 MMHG

## 2018-11-06 VITALS — DIASTOLIC BLOOD PRESSURE: 72 MMHG | SYSTOLIC BLOOD PRESSURE: 128 MMHG

## 2018-11-06 VITALS — SYSTOLIC BLOOD PRESSURE: 128 MMHG | DIASTOLIC BLOOD PRESSURE: 58 MMHG

## 2018-11-06 VITALS — DIASTOLIC BLOOD PRESSURE: 80 MMHG | SYSTOLIC BLOOD PRESSURE: 138 MMHG

## 2018-11-06 VITALS — DIASTOLIC BLOOD PRESSURE: 80 MMHG | SYSTOLIC BLOOD PRESSURE: 147 MMHG

## 2018-11-06 VITALS — SYSTOLIC BLOOD PRESSURE: 131 MMHG | DIASTOLIC BLOOD PRESSURE: 68 MMHG

## 2018-11-06 VITALS — DIASTOLIC BLOOD PRESSURE: 79 MMHG | SYSTOLIC BLOOD PRESSURE: 140 MMHG

## 2018-11-06 VITALS — SYSTOLIC BLOOD PRESSURE: 149 MMHG | DIASTOLIC BLOOD PRESSURE: 78 MMHG

## 2018-11-06 VITALS — DIASTOLIC BLOOD PRESSURE: 70 MMHG | SYSTOLIC BLOOD PRESSURE: 143 MMHG

## 2018-11-06 VITALS — SYSTOLIC BLOOD PRESSURE: 152 MMHG | DIASTOLIC BLOOD PRESSURE: 75 MMHG

## 2018-11-06 VITALS — SYSTOLIC BLOOD PRESSURE: 125 MMHG | DIASTOLIC BLOOD PRESSURE: 70 MMHG

## 2018-11-06 VITALS — SYSTOLIC BLOOD PRESSURE: 143 MMHG | DIASTOLIC BLOOD PRESSURE: 70 MMHG

## 2018-11-06 LAB
ADD MANUAL DIFF: NO
ALBUMIN SERPL-MCNC: 3.1 G/DL (ref 3.4–5)
ALBUMIN/GLOB SERPL: 0.8 {RATIO} (ref 1–2.7)
ALP SERPL-CCNC: 112 U/L (ref 46–116)
ALT SERPL-CCNC: 248 U/L (ref 12–78)
AMYLASE SERPL-CCNC: 45 U/L (ref 25–115)
ANION GAP SERPL CALC-SCNC: 3 MMOL/L (ref 5–15)
APTT BLD: 26 SEC (ref 23–33)
AST SERPL-CCNC: 68 U/L (ref 15–37)
BASOPHILS NFR BLD AUTO: 0.7 % (ref 0–2)
BILIRUB SERPL-MCNC: 0.5 MG/DL (ref 0.2–1)
BUN SERPL-MCNC: 16 MG/DL (ref 7–18)
CALCIUM SERPL-MCNC: 8.4 MG/DL (ref 8.5–10.1)
CHLORIDE SERPL-SCNC: 108 MMOL/L (ref 98–107)
CO2 SERPL-SCNC: 29 MMOL/L (ref 21–32)
CREAT SERPL-MCNC: 0.9 MG/DL (ref 0.55–1.3)
EOSINOPHIL NFR BLD AUTO: 0.6 % (ref 0–3)
ERYTHROCYTE [DISTWIDTH] IN BLOOD BY AUTOMATED COUNT: 11.4 % (ref 11.6–14.8)
GLOBULIN SER-MCNC: 3.8 G/DL
HCT VFR BLD CALC: 38.3 % (ref 42–52)
HGB BLD-MCNC: 13.2 G/DL (ref 14.2–18)
INR PPP: 1 (ref 0.9–1.1)
LYMPHOCYTES NFR BLD AUTO: 9.9 % (ref 20–45)
MCV RBC AUTO: 90 FL (ref 80–99)
MONOCYTES NFR BLD AUTO: 12.1 % (ref 1–10)
NEUTROPHILS NFR BLD AUTO: 76.7 % (ref 45–75)
PLATELET # BLD: 129 K/UL (ref 150–450)
POTASSIUM SERPL-SCNC: 4.1 MMOL/L (ref 3.5–5.1)
RBC # BLD AUTO: 4.27 M/UL (ref 4.7–6.1)
SODIUM SERPL-SCNC: 140 MMOL/L (ref 136–145)
WBC # BLD AUTO: 7.8 K/UL (ref 4.8–10.8)

## 2018-11-06 PROCEDURE — 0DB78ZX EXCISION OF STOMACH, PYLORUS, VIA NATURAL OR ARTIFICIAL OPENING ENDOSCOPIC, DIAGNOSTIC: ICD-10-PCS

## 2018-11-06 RX ADMIN — INSULIN ASPART SCH UNITS: 100 INJECTION, SOLUTION INTRAVENOUS; SUBCUTANEOUS at 20:24

## 2018-11-06 RX ADMIN — INSULIN ASPART SCH UNITS: 100 INJECTION, SOLUTION INTRAVENOUS; SUBCUTANEOUS at 05:53

## 2018-11-06 RX ADMIN — DIVALPROEX SODIUM SCH MG: 500 TABLET, FILM COATED, EXTENDED RELEASE ORAL at 20:23

## 2018-11-06 RX ADMIN — ZIPRASIDONE HYDROCHLORIDE SCH MG: 20 CAPSULE ORAL at 08:50

## 2018-11-06 RX ADMIN — INSULIN ASPART SCH UNITS: 100 INJECTION, SOLUTION INTRAVENOUS; SUBCUTANEOUS at 17:56

## 2018-11-06 RX ADMIN — DEXTROSE MONOHYDRATE SCH MLS/HR: 50 INJECTION, SOLUTION INTRAVENOUS at 17:42

## 2018-11-06 RX ADMIN — LOSARTAN POTASSIUM SCH MG: 50 TABLET, FILM COATED ORAL at 08:50

## 2018-11-06 RX ADMIN — ZIPRASIDONE HYDROCHLORIDE SCH MG: 20 CAPSULE ORAL at 17:42

## 2018-11-06 RX ADMIN — TAMSULOSIN HYDROCHLORIDE SCH MG: 0.4 CAPSULE ORAL at 20:23

## 2018-11-06 RX ADMIN — DIVALPROEX SODIUM SCH MG: 500 TABLET, FILM COATED, EXTENDED RELEASE ORAL at 08:50

## 2018-11-06 RX ADMIN — DEXTROSE AND SODIUM CHLORIDE SCH MLS/HR: 5; .45 INJECTION, SOLUTION INTRAVENOUS at 05:51

## 2018-11-06 RX ADMIN — DEXTROSE AND SODIUM CHLORIDE SCH MLS/HR: 5; .45 INJECTION, SOLUTION INTRAVENOUS at 15:44

## 2018-11-06 RX ADMIN — INSULIN ASPART SCH UNITS: 100 INJECTION, SOLUTION INTRAVENOUS; SUBCUTANEOUS at 11:20

## 2018-11-06 NOTE — GENERAL PROGRESS NOTE
Assessment/Plan


Problem List:  


(1) HTN (hypertension)


ICD Codes:  I10 - Essential (primary) hypertension


SNOMED:  00809102


(2) Diabetes


ICD Codes:  E11.9 - Type 2 diabetes mellitus without complications


SNOMED:  36585831


(3) COPD (chronic obstructive pulmonary disease)


ICD Codes:  J44.9 - Chronic obstructive pulmonary disease, unspecified


SNOMED:  57796674


(4) Psychiatric disorder


ICD Codes:  F99 - Mental disorder, not otherwise specified


SNOMED:  20266801, 645987443


(5) Parkinson disease


ICD Codes:  G20 - Parkinson's disease


SNOMED:  88161136


(6) UGI bleed


ICD Codes:  K92.2 - Gastrointestinal hemorrhage, unspecified


SNOMED:  45715856


(7) Weak


ICD Codes:  R53.1 - Weakness


SNOMED:  51876427


(8) Abdominal pain


ICD Codes:  R10.9 - Unspecified abdominal pain


SNOMED:  12273999


Status:  unchanged


Assessment/Plan


ot pt diet gi f/u cbc bmp am





Subjective


Constitutional:  Reports: weakness


Allergies:  


Coded Allergies:  


     No Known Allergies (Unverified , 1/27/18)


All Systems:  reviewed and negative except above


Subjective


calm in bed awating egd





Objective





Last 24 Hour Vital Signs








  Date Time  Temp Pulse Resp B/P (MAP) Pulse Ox O2 Delivery O2 Flow Rate FiO2


 


11/6/18 09:00      Room Air  


 


11/6/18 08:00 97.9 83 18 147/80 (102) 98   


 


11/6/18 04:00 98.4 72 21 131/68 (89) 95   


 


11/6/18 00:00 98.6 75 20 143/70 (94) 98   


 


11/5/18 21:00      Room Air  


 


11/5/18 20:00 98.7 85 19 124/62 (82) 96   


 


11/5/18 16:00 99.7 75 20 139/75 (96) 97   


 


11/5/18 13:11      Room Air  

















Intake and Output  


 


 11/5/18 11/6/18





 19:00 07:00


 


Intake Total  900 ml


 


Output Total 0 ml 


 


Balance 0 ml 900 ml


 


  


 


Intake IV Total  900 ml


 


Output Urine Total 0 ml 








Laboratory Tests


11/6/18 05:55: 


White Blood Count 7.8, Red Blood Count 4.27L, Hemoglobin 13.2L, Hematocrit 38.3L

, Mean Corpuscular Volume 90, Mean Corpuscular Hemoglobin 30.9, Mean 

Corpuscular Hemoglobin Concent 34.3, Red Cell Distribution Width 11.4L, 

Platelet Count 129L, Mean Platelet Volume 5.8L, Neutrophils (%) (Auto) 76.7H, 

Lymphocytes (%) (Auto) 9.9L, Monocytes (%) (Auto) 12.1H, Eosinophils (%) (Auto) 

0.6, Basophils (%) (Auto) 0.7, Prothrombin Time 10.1, Prothromb Time 

International Ratio 1.0, Activated Partial Thromboplast Time 26, Sodium Level 

140, Potassium Level 4.1, Chloride Level 108H, Carbon Dioxide Level 29, Anion 

Gap 3L, Blood Urea Nitrogen 16, Creatinine 0.9, Estimat Glomerular Filtration 

Rate > 60, Glucose Level 161H, Calcium Level 8.4L, Total Bilirubin 0.5, 

Aspartate Amino Transf (AST/SGOT) 68H, Alanine Aminotransferase (ALT/SGPT) 248H

, Alkaline Phosphatase 112, Total Protein 6.9, Albumin 3.1L, Globulin 3.8, 

Albumin/Globulin Ratio 0.8L, Amylase Level 45, Lipase 75, Hepatitis A IgM 

Antibody [Pending], Hepatitis B Surface Antigen [Pending], Hepatitis B Core IgM 

Antibody [Pending], Hepatitis C Antibody [Pending]


Height (Feet):  6


Height (Inches):  2.00


Weight (Pounds):  192


General Appearance:  lethargic


EENT:  normal ENT inspection


Neck:  normal alignment


Cardiovascular:  normal peripheral pulses, normal rate, regular rhythm


Respiratory/Chest:  chest wall non-tender, lungs clear, normal breath sounds


Abdomen:  normal bowel sounds, non tender, soft


Extremities:  normal inspection


Edema:  no edema noted Arm (L), no edema noted Arm (R), no edema noted Leg (L), 

no edema noted Leg (R), no edema noted Pedal (L), no edema noted Pedal (R), no 

edema noted Generalized


Neurologic:  motor weakness


Skin:  normal pigmentation, warm/dry











Jean-Pierre Henderson DO Nov 6, 2018 12:43

## 2018-11-06 NOTE — PRE-PROCEDURE NOTE/ATTESTATION
Pre-Procedure Note/Attestation


Complete Prior to Procedure


Planned Procedure:  not applicable


Procedure Narrative:


egd





Indications for Procedure


Pre-Operative Diagnosis:


gib





Attestation


I attest that I discussed the nature of the procedure; its benefits; risks and 

complications; and alternatives (and the risks and benefits of such alternatives

), prior to the procedure, with the patient (or the patient's legal 

representative).





I attest that, if there was a reasonable possibility of needing a blood 

transfusion, the patient (or the patient's legal representative) was given the 

[facility] standardized written summary, pursuant 

to the Dex Mckenna Blood Safety Act ([place] Health and Safety Code # 1645, as 

amended).





I attest that I re-evaluated the patient just prior to the surgery and that 

there has been no change in the patient's H&P, except as documented below:











Curtis Elizondo MD Nov 6, 2018 12:10

## 2018-11-06 NOTE — IMMEDIATE POST-OP EVALUATION
Immediate Post-Op Evalulation


Immediate Post-Op Evalulation


Procedure:  EGD with Bx


Date of Evaluation:  Nov 6, 2018


Time of Evaluation:  12:53


IV Fluids:  200


Blood Products:  none


Estimated Blood Loss:  none


Urinary Output:  none


Blood Pressure Systolic:  140


Blood Pressure Diastolic:  79


Pulse Rate:  76


Respiratory Rate:  20


O2 Sat by Pulse Oximetry:  99


Temperature (Fahrenheit):  98.8


Pain Score (1-10):  1


Nausea:  No


Vomiting:  No


Complications


none


Patient Status:  awake, patent, none


Hydration Status:  adequate











Luis Covington MD Nov 6, 2018 12:53

## 2018-11-06 NOTE — CARDIOLOGY REPORT
--------------- APPROVED REPORT --------------





EKG Measurement

Heart [id number]

[id number]

[id number]

[id number]

[id number]





Normal sinus rhythm

Normal ECG

## 2018-11-06 NOTE — 48 HOUR POST ANESTHESIA EVAL
Post Anesthesia Evaluation


Procedure:  EGD with Bx


Date of Evaluation:  Nov 6, 2018


Time of Evaluation:  13:51


Blood Pressure Systolic:  143


0:  68


Pulse Rate:  74


Respiratory Rate:  20


Temperature (Fahrenheit):  97.6


O2 Sat by Pulse Oximetry:  98


Airway:  patent


Nausea:  No


Vomiting:  No


Pain Intensity:  1


Hydration Status:  adequate


Cardiopulmonary Status:


stable


Mental Status/LOC:  patient returned to baseline


Follow-up Care/Observations:


n/a


Post-Anesthesia Complications:


none


Follow-up care needed:  N/A











Luis Covington MD Nov 6, 2018 13:52

## 2018-11-06 NOTE — ANETHESIA PREOPERATIVE EVAL
Anesthesia Pre-op PMH/ROS


General


Date of Evaluation:  Nov 6, 2018


Time of Evaluation:  12:14


Anesthesiologist:  Adria


ASA Score:  ASA 3


Mallampati Score


Class I : Soft palate, uvula, fauces, pillars visible


Class II: Soft palate, uvula, fauces visible


Class III: Soft palate, base of uvula visible


Class IV: Only hard plate visible


Mallampati Classification:  Class II


Surgeon:  Mikhail


Diagnosis:  Abdominal pain


Surgical Procedure:  EGD


Anesthesia History:  none


Family History:  no anesthesia problems


Allergies:  


Coded Allergies:  


     No Known Allergies (Unverified , 1/27/18)


Patient NPO?:  Yes





Past Medical History


Cardiovascular:  Reports: HTN; 


   Denies: CAD, MI, valve dz, arrhythmia, other


Pulmonary:  Reports: COPD; 


   Denies: asthma, KESHA, other


Gastrointestinal/Genitourinary:  Reports: GERD; 


   Denies: CRI, ESRD, other


Neurologic/Psychiatric:  Reports: other - Parkinsons, schizophrenia; 


   Denies: dementia, CVA, depression/anxiety, TIA


Endocrine:  Reports: DM; 


   Denies: hypothyroidism, steroids, other


HEENT:  Denies: cataract (L), cataract (R), glaucoma, [place] (L), [place] (R), other


Hematology/Immune:  Denies: anemia, DVT, bleeding disorder, other


Musculoskeletal/Integumentary:  Denies: OA, RA, DJD, DDD, edema, other


PMH Narrative:


as above


PSxH Narrative:


See H&P





Anesthesia Pre-op Phys. Exam


Physician Exam





Last Vital Signs








  Date Time  Temp Pulse Resp B/P (MAP) Pulse Ox O2 Delivery O2 Flow Rate FiO2


 


11/6/18 09:00      Room Air  


 


11/6/18 08:00 97.9 83 18 147/80 (102) 98   








Constitutional:  NAD


Neurologic:  other - unable to obtaine


Cardiovascular:  RRR, no M/R/G


Respiratory:  CTA


Gastrointestinal:  S/NT/ND





Airway Exam


Mallampati Score:  Class II


MO:  limited


Neck:  stiff


ROM:  limited


Teeth:  missing


Dentures:  no upper, no lower





Anesthesia Pre-op A/P


Labs





Hematology








Test


  11/6/18


05:55


 


White Blood Count


  7.8 K/UL


(4.8-10.8)


 


Red Blood Count


  4.27 M/UL


(4.70-6.10)  L


 


Hemoglobin


  13.2 G/DL


(14.2-18.0)  L


 


Hematocrit


  38.3 %


(42.0-52.0)  L


 


Mean Corpuscular Volume 90 FL (80-99)  


 


Mean Corpuscular Hemoglobin


  30.9 PG


(27.0-31.0)


 


Mean Corpuscular Hemoglobin


Concent 34.3 G/DL


(32.0-36.0)


 


Red Cell Distribution Width


  11.4 %


(11.6-14.8)  L


 


Platelet Count


  129 K/UL


(150-450)  L


 


Mean Platelet Volume


  5.8 FL


(6.5-10.1)  L


 


Neutrophils (%) (Auto)


  76.7 %


(45.0-75.0)  H


 


Lymphocytes (%) (Auto)


  9.9 %


(20.0-45.0)  L


 


Monocytes (%) (Auto)


  12.1 %


(1.0-10.0)  H


 


Eosinophils (%) (Auto)


  0.6 %


(0.0-3.0)


 


Basophils (%) (Auto)


  0.7 %


(0.0-2.0)








Coagulation








Test


  11/6/18


05:55


 


Prothrombin Time


  10.1 SEC


(9.30-11.50)


 


Prothromb Time International


Ratio 1.0 (0.9-1.1)  


 


 


Activated Partial


Thromboplast Time 26 SEC (23-33)


 








Chemistry








Test


  11/6/18


05:55


 


Sodium Level


  140 MMOL/L


(136-145)


 


Potassium Level


  4.1 MMOL/L


(3.5-5.1)


 


Chloride Level


  108 MMOL/L


()  H


 


Carbon Dioxide Level


  29 MMOL/L


(21-32)


 


Anion Gap


  3 mmol/L


(5-15)  L


 


Blood Urea Nitrogen


  16 mg/dL


(7-18)


 


Creatinine


  0.9 MG/DL


(0.55-1.30)


 


Estimat Glomerular Filtration


Rate > 60 mL/min


(>60)


 


Glucose Level


  161 MG/DL


()  H


 


Calcium Level


  8.4 MG/DL


(8.5-10.1)  L


 


Total Bilirubin


  0.5 MG/DL


(0.2-1.0)


 


Aspartate Amino Transf


(AST/SGOT) 68 U/L (15-37)


H


 


Alanine Aminotransferase


(ALT/SGPT) 248 U/L


(12-78)  H


 


Alkaline Phosphatase


  112 U/L


()


 


Total Protein


  6.9 G/DL


(6.4-8.2)


 


Albumin


  3.1 G/DL


(3.4-5.0)  L


 


Globulin 3.8 g/dL  


 


Albumin/Globulin Ratio


  0.8 (1.0-2.7)


L


 


Amylase Level


  45 U/L


()


 


Lipase


  75 U/L


()











Risk Assessment & Plan


Assessment:


ASA 3


Plan:


MAC


Status Change Before Surgery:  No





Pre-Antibiotics


Drug:  none











Luis Covington MD Nov 6, 2018 12:52

## 2018-11-06 NOTE — PROCEDURE NOTE
DATE OF PROCEDURE:  11/06/2018

SURGEON:  Curtis Elizondo M.D.



ANESTHESIOLOGIST:  Dr. Covington.



PROCEDURE:  Upper endoscopy with biopsy.



ANESTHESIA:  Per Dr. Covington.



INSTRUMENT:  Olympus adult flexible upper endoscope.



INDICATION:  Upper GI bleeding.



The procedure, risks, benefits, and possible consequences, including

hemorrhage, aspiration, perforation and infection, and alternative

treatments, were explained to the patient/legal guardian by Dr. Curtis Elizondo and the patient/legal guardian understood and accepted these

risks.



DESCRIPTION OF PROCEDURE:  After informed consent was obtained and the

patient was adequately sedated, Olympus upper endoscope was advanced from

the mouth into the second portion of duodenum and retroflexion was

performed in the stomach.



The patient had some irregular Z-line without any obvious significant

esophageal ulceration.  There was evidence of a small hiatal hernia.  In

the stomach, there was diffuse gastritis.  Random biopsies from antrum and

body was obtained to rule out H. pylori infection.  The rest of upper

endoscopic examination grossly looked within normal limits.  The patient

tolerated the procedure very well without any complication.



SUMMARY OF FINDINGS:

1. Irregular Z-line.

2. Small hiatal hernia.

3. Gastritis, status post biopsy.



RECOMMENDATIONS:

1. Follow up biopsy results and treat accordingly.

2. The patient will need outpatient colonoscopy.









  ______________________________________________

  Curtis Elizondo M.D.





DR:  Rogelio

D:  11/06/2018 13:18

T:  11/06/2018 16:14

JOB#:  [id number]

CC:

## 2018-11-06 NOTE — PULMONOLOGY PROGRESS NOTE
Assessment/Plan


Problems:  


(1) UGI bleed


(2) Abdominal pain


(3) COPD (chronic obstructive pulmonary disease)


(4) CAD (coronary artery disease)


(5) Psychiatric disorder


(6) Parkinson disease


(7) Failure to thrive


Assessment/Plan


NPO


endoscopy scheduled


iv fluids


check electrolytes


symptomatic treatment


respiratory treatment





Subjective


ROS Limited/Unobtainable:  No


Interval Events:  no new complains


Constitutional:  Reports: no symptoms


HEENT:  Repors: no symptoms


Respiratory:  Reports: no symptoms


Allergies:  


Coded Allergies:  


     No Known Allergies (Unverified , 1/27/18)





Objective





Last 24 Hour Vital Signs








  Date Time  Temp Pulse Resp B/P (MAP) Pulse Ox O2 Delivery O2 Flow Rate FiO2


 


11/6/18 09:00      Room Air  


 


11/6/18 08:00 97.9 83 18 147/80 (102) 98   


 


11/6/18 04:00 98.4 72 21 131/68 (89) 95   


 


11/6/18 00:00 98.6 75 20 143/70 (94) 98   


 


11/5/18 21:00      Room Air  


 


11/5/18 20:00 98.7 85 19 124/62 (82) 96   


 


11/5/18 16:00 99.7 75 20 139/75 (96) 97   


 


11/5/18 13:11      Room Air  


 


11/5/18 12:20 98.2 75 17 147/57 98 Room Air  


 


11/5/18 12:05 98.2 75 17 147/57 98 Room Air  

















Intake and Output  


 


 11/5/18 11/6/18





 19:00 07:00


 


Intake Total  900 ml


 


Output Total 0 ml 


 


Balance 0 ml 900 ml


 


  


 


Intake IV Total  900 ml


 


Output Urine Total 0 ml 








General Appearance:  WD/WN


HEENT:  normocephalic, atraumatic


Respiratory/Chest:  chest wall non-tender, lungs clear


Abdomen:  normal bowel sounds, no organomegaly


Extremities:  no cyanosis


Skin:  no lesions, no ulcers





Microbiology








 Date/Time


Source Procedure


Growth Status


 


 


 11/5/18 10:52


Rectum - Preliminary Resulted


 


 11/5/18 10:52


Rectum  Received








Laboratory Tests


11/5/18 10:50: 


Urine Color Pale yellow, Urine Appearance Clear, Urine pH 9, Urine Specific 

Gravity 1.015, Urine Protein 2+H, Urine Glucose (UA) 1+H, Urine Ketones 3+H, 

Urine Blood 1+H, Urine Nitrite Negative, Urine Bilirubin Negative, Urine 

Urobilinogen Normal, Urine Leukocyte Esterase Negative, Urine RBC 2-4H, Urine 

WBC 0-2, Urine Squamous Epithelial Cells Occasional, Urine Amorphous Sediment 

FewH, Urine Bacteria Occasional


11/6/18 05:55: 


White Blood Count 7.8, Red Blood Count 4.27L, Hemoglobin 13.2L, Hematocrit 38.3L

, Mean Corpuscular Volume 90, Mean Corpuscular Hemoglobin 30.9, Mean 

Corpuscular Hemoglobin Concent 34.3, Red Cell Distribution Width 11.4L, 

Platelet Count 129L, Mean Platelet Volume 5.8L, Neutrophils (%) (Auto) 76.7H, 

Lymphocytes (%) (Auto) 9.9L, Monocytes (%) (Auto) 12.1H, Eosinophils (%) (Auto) 

0.6, Basophils (%) (Auto) 0.7, Prothrombin Time 10.1, Prothromb Time 

International Ratio 1.0, Activated Partial Thromboplast Time 26, Sodium Level 

140, Potassium Level 4.1, Chloride Level 108H, Carbon Dioxide Level 29, Anion 

Gap 3L, Blood Urea Nitrogen 16, Creatinine 0.9, Estimat Glomerular Filtration 

Rate > 60, Glucose Level 161H, Calcium Level 8.4L, Total Bilirubin 0.5, 

Aspartate Amino Transf (AST/SGOT) 68H, Alanine Aminotransferase (ALT/SGPT) 248H

, Alkaline Phosphatase 112, Total Protein 6.9, Albumin 3.1L, Globulin 3.8, 

Albumin/Globulin Ratio 0.8L, Amylase Level 45, Lipase 75, Hepatitis A IgM 

Antibody [Pending], Hepatitis B Surface Antigen [Pending], Hepatitis B Core IgM 

Antibody [Pending], Hepatitis C Antibody [Pending]





Current Medications








 Medications


  (Trade)  Dose


 Ordered  Sig/Erasmo


 Route


 PRN Reason  Start Time


 Stop Time Status Last Admin


Dose Admin


 


 Acetaminophen


  (Tylenol)  650 mg  Q4H  PRN


 ORAL


 fever  11/5/18 13:00


 12/5/18 12:59   


 


 


 Al Hydroxide/Mg


 Hydroxide


  (Mylanta II)  30 ml  Q6H  PRN


 ORAL


 dyspepsia  11/5/18 13:00


 12/5/18 12:59   


 


 


 Amlodipine


 Besylate


  (Norvasc)  5 mg  DAILY


 ORAL


   11/6/18 09:00


 12/6/18 08:59   


 


 


 Dextrose


  (Dextrose 50%)  25 ml  Q30M  PRN


 IV


 Hypoglycemia  11/5/18 13:15


 12/5/18 13:06   


 


 


 Dextrose


  (Dextrose 50%)  50 ml  Q30M  PRN


 IV


 hypoglycemia  11/5/18 13:15


 12/5/18 13:14   


 


 


 Dextrose/Sodium


 Chloride  1,000 ml @ 


 75 mls/hr  [id number]


 IV


   11/5/18 13:04


 12/5/18 13:03  11/6/18 05:51


 


 


 Diphenhydramine


 HCl


  (Benadryl)  25 mg  Q6H  PRN


 ORAL


 Itching/Pruritis  11/5/18 13:00


 12/5/18 12:59   


 


 


 Divalproex Sodium


  (Depakote ER)  500 mg  EVERY 12  HOURS


 ORAL


   11/5/18 21:00


 12/5/18 20:59  11/5/18 20:27


 


 


 Finasteride


  (Proscar)  5 mg  DAILY


 ORAL


   11/6/18 09:00


 12/6/18 08:59   


 


 


 Insulin Aspart


  (NovoLOG)    BEFORE MEALS AND  HS


 SUBQ


   11/5/18 16:30


 12/5/18 16:29  11/6/18 05:53


 


 


 Losartan Potassium


  (Cozaar)  50 mg  DAILY


 ORAL


   11/6/18 09:00


 12/6/18 08:59   


 


 


 Morphine Sulfate


  (Morphine


 Sulfate)  2 mg  Q4H  PRN


 IVP


 severe  Pain (Pain Scale 7-10)  11/5/18 13:00


 11/12/18 12:59   


 


 


 Nitroglycerin


  (Ntg)  0.4 mg  Q5M X 3 DOSES PRN


 SL


 Prn Chest Pain  11/5/18 13:00


 12/5/18 12:59   


 


 


 Ondansetron HCl


  (Zofran)  4 mg  Q6H  PRN


 IVP


 Nausea & Vomiting  11/5/18 13:00


 12/5/18 12:59   


 


 


 Polyethylene


 Glycol


  (Miralax)  17 gm  HSPRN  PRN


 ORAL


 Constipation  11/5/18 13:00


 12/5/18 12:59   


 


 


 Quetiapine


 Fumarate


  (SEROquel)  50 mg  BID


 ORAL


   11/5/18 18:00


 12/5/18 17:59  11/5/18 17:11


 


 


 Sitagliptin


 Phosphate


  (Januvia)  100 mg  DAILY


 ORAL


   11/6/18 09:00


 12/6/18 08:59   


 


 


 Tamsulosin HCl


  (Flomax)  0.4 mg  BEDTIME


 ORAL


   11/5/18 21:00


 12/5/18 20:59  11/5/18 20:27


 


 


 Temazepam


  (Restoril)  15 mg  HSPRN  PRN


 ORAL


 Insomnia  11/5/18 13:00


 11/12/18 12:59   


 


 


 Ziprasidone


  (Geodon)  20 mg  TWICE A  DAY


 ORAL


   11/5/18 18:00


 12/5/18 17:59  11/5/18 17:10


 

















Kavya Joshi MD Nov 6, 2018 10:31

## 2018-11-07 VITALS — SYSTOLIC BLOOD PRESSURE: 130 MMHG | DIASTOLIC BLOOD PRESSURE: 69 MMHG

## 2018-11-07 VITALS — SYSTOLIC BLOOD PRESSURE: 136 MMHG | DIASTOLIC BLOOD PRESSURE: 66 MMHG

## 2018-11-07 VITALS — SYSTOLIC BLOOD PRESSURE: 110 MMHG | DIASTOLIC BLOOD PRESSURE: 53 MMHG

## 2018-11-07 VITALS — SYSTOLIC BLOOD PRESSURE: 135 MMHG | DIASTOLIC BLOOD PRESSURE: 63 MMHG

## 2018-11-07 VITALS — DIASTOLIC BLOOD PRESSURE: 50 MMHG | SYSTOLIC BLOOD PRESSURE: 114 MMHG

## 2018-11-07 LAB
ADD MANUAL DIFF: NO
ALBUMIN SERPL-MCNC: 2.7 G/DL (ref 3.4–5)
ALBUMIN/GLOB SERPL: 0.7 {RATIO} (ref 1–2.7)
ALP SERPL-CCNC: 94 U/L (ref 46–116)
ALT SERPL-CCNC: 162 U/L (ref 12–78)
ANION GAP SERPL CALC-SCNC: 8 MMOL/L (ref 5–15)
APTT BLD: 28 SEC (ref 23–33)
AST SERPL-CCNC: 32 U/L (ref 15–37)
BASOPHILS NFR BLD AUTO: 0.9 % (ref 0–2)
BILIRUB SERPL-MCNC: 0.5 MG/DL (ref 0.2–1)
BUN SERPL-MCNC: 13 MG/DL (ref 7–18)
CALCIUM SERPL-MCNC: 8.5 MG/DL (ref 8.5–10.1)
CHLORIDE SERPL-SCNC: 107 MMOL/L (ref 98–107)
CO2 SERPL-SCNC: 27 MMOL/L (ref 21–32)
CREAT SERPL-MCNC: 0.9 MG/DL (ref 0.55–1.3)
EOSINOPHIL NFR BLD AUTO: 0.7 % (ref 0–3)
ERYTHROCYTE [DISTWIDTH] IN BLOOD BY AUTOMATED COUNT: 11.2 % (ref 11.6–14.8)
GLOBULIN SER-MCNC: 4 G/DL
HCT VFR BLD CALC: 35.5 % (ref 42–52)
HGB BLD-MCNC: 12.6 G/DL (ref 14.2–18)
INR PPP: 0.9 (ref 0.9–1.1)
LYMPHOCYTES NFR BLD AUTO: 12.4 % (ref 20–45)
MCV RBC AUTO: 90 FL (ref 80–99)
MONOCYTES NFR BLD AUTO: 12.2 % (ref 1–10)
NEUTROPHILS NFR BLD AUTO: 73.8 % (ref 45–75)
PHOSPHATE SERPL-MCNC: 2.2 MG/DL (ref 2.5–4.9)
PLATELET # BLD: 121 K/UL (ref 150–450)
POTASSIUM SERPL-SCNC: 3.9 MMOL/L (ref 3.5–5.1)
RBC # BLD AUTO: 3.96 M/UL (ref 4.7–6.1)
SODIUM SERPL-SCNC: 142 MMOL/L (ref 136–145)
WBC # BLD AUTO: 8.3 K/UL (ref 4.8–10.8)

## 2018-11-07 RX ADMIN — DEXTROSE AND SODIUM CHLORIDE SCH MLS/HR: 5; .45 INJECTION, SOLUTION INTRAVENOUS at 17:17

## 2018-11-07 RX ADMIN — INSULIN ASPART SCH UNITS: 100 INJECTION, SOLUTION INTRAVENOUS; SUBCUTANEOUS at 20:30

## 2018-11-07 RX ADMIN — DIVALPROEX SODIUM SCH MG: 500 TABLET, FILM COATED, EXTENDED RELEASE ORAL at 20:28

## 2018-11-07 RX ADMIN — INSULIN ASPART SCH UNITS: 100 INJECTION, SOLUTION INTRAVENOUS; SUBCUTANEOUS at 12:38

## 2018-11-07 RX ADMIN — DEXTROSE AND SODIUM CHLORIDE SCH MLS/HR: 5; .45 INJECTION, SOLUTION INTRAVENOUS at 00:27

## 2018-11-07 RX ADMIN — DEXTROSE MONOHYDRATE SCH MLS/HR: 50 INJECTION, SOLUTION INTRAVENOUS at 06:23

## 2018-11-07 RX ADMIN — ZIPRASIDONE HYDROCHLORIDE SCH MG: 20 CAPSULE ORAL at 17:16

## 2018-11-07 RX ADMIN — INSULIN ASPART SCH UNITS: 100 INJECTION, SOLUTION INTRAVENOUS; SUBCUTANEOUS at 06:31

## 2018-11-07 RX ADMIN — INSULIN ASPART SCH UNITS: 100 INJECTION, SOLUTION INTRAVENOUS; SUBCUTANEOUS at 16:31

## 2018-11-07 RX ADMIN — ZIPRASIDONE HYDROCHLORIDE SCH MG: 20 CAPSULE ORAL at 09:00

## 2018-11-07 RX ADMIN — DEXTROSE MONOHYDRATE SCH MLS/HR: 50 INJECTION, SOLUTION INTRAVENOUS at 21:51

## 2018-11-07 RX ADMIN — DIVALPROEX SODIUM SCH MG: 500 TABLET, FILM COATED, EXTENDED RELEASE ORAL at 09:00

## 2018-11-07 RX ADMIN — DEXTROSE MONOHYDRATE SCH MLS/HR: 50 INJECTION, SOLUTION INTRAVENOUS at 14:48

## 2018-11-07 RX ADMIN — TAMSULOSIN HYDROCHLORIDE SCH MG: 0.4 CAPSULE ORAL at 20:28

## 2018-11-07 RX ADMIN — LOSARTAN POTASSIUM SCH MG: 50 TABLET, FILM COATED ORAL at 09:00

## 2018-11-07 NOTE — PULMONOLOGY PROGRESS NOTE
Assessment/Plan


Problems:  


(1) UGI bleed


(2) Abdominal pain


(3) COPD (chronic obstructive pulmonary disease)


(4) CAD (coronary artery disease)


(5) Psychiatric disorder


(6) Parkinson disease


(7) Failure to thrive


Assessment/Plan


no new complains


all reviewed


iv fluids


check electrolytes


symptomatic treatment


respiratory treatment


dc planning





Subjective


ROS Limited/Unobtainable:  No


Constitutional:  Reports: no symptoms


HEENT:  Repors: no symptoms


Respiratory:  Reports: no symptoms


Allergies:  


Coded Allergies:  


     No Known Allergies (Unverified , 1/27/18)





Objective





Last 24 Hour Vital Signs








  Date Time  Temp Pulse Resp B/P (MAP) Pulse Ox O2 Delivery O2 Flow Rate FiO2


 


11/7/18 12:00 97.9 72 20 136/66 (89)    


 


11/7/18 09:56 100.3       


 


11/7/18 09:00      Room Air  


 


11/7/18 08:00 100.3 78 20 135/63 (87)    


 


11/7/18 04:00 99.2 76 18 110/53 (72)    


 


11/7/18 00:00 98.0 79 19 130/69 (89) 98   


 


11/6/18 21:00      Room Air  


 


11/6/18 20:00 97.8 69 18 128/72 (90) 97   


 


11/6/18 18:13 98.8       


 


11/6/18 16:00 100.2 84 18 128/58 (81) 99   

















Intake and Output  


 


 11/6/18 11/7/18





 19:00 07:00


 


Intake Total 1212.5 ml 


 


Output Total 300 ml 800 ml


 


Balance 912.5 ml -800 ml


 


  


 


Intake Oral 360 ml 


 


IV Total 852.5 ml 


 


Output Urine Total 300 ml 800 ml


 


# Voids 1 








General Appearance:  WD/WN


HEENT:  normocephalic, atraumatic


Respiratory/Chest:  chest wall non-tender, lungs clear


Cardiovascular:  normal peripheral pulses, normal rate


Abdomen:  normal bowel sounds, soft, non tender


Genitourinary:  normal external genitalia


Extremities:  no clubbing


Neurologic/Psychiatric:  CNs II-XII grossly normal





Microbiology








 Date/Time


Source Procedure


Growth Status


 


 


 11/5/18 10:52


Nasal Nares MRSA Culture - Final


NO METHICILLIN RESISTANT STAPH AUREUS... Complete


 


 11/6/18 17:30


Urine,Clean Catch Urine Culture - Preliminary


NO GROWTH Resulted


 


 11/5/18 10:52


Rectum - Final


NO CARBAPENEM-RESISTANT ENTEROBACTERI... Complete


 


 11/5/18 10:52


Rectum VRE Culture - Final


NO VANCOMYCIN RESISTANT ENTEROCOCCUS ... Complete








Laboratory Tests


11/7/18 05:30: 


White Blood Count 8.3, Red Blood Count 3.96L, Hemoglobin 12.6L, Hematocrit 35.5L

, Mean Corpuscular Volume 90, Mean Corpuscular Hemoglobin 31.8H, Mean 

Corpuscular Hemoglobin Concent 35.4, Red Cell Distribution Width 11.2L, 

Platelet Count 121L, Mean Platelet Volume 6.1L, Neutrophils (%) (Auto) 73.8, 

Lymphocytes (%) (Auto) 12.4L, Monocytes (%) (Auto) 12.2H, Eosinophils (%) (Auto

) 0.7, Basophils (%) (Auto) 0.9, Prothrombin Time 10.0, Prothromb Time 

International Ratio 0.9, Activated Partial Thromboplast Time 28, Sodium Level 

142, Potassium Level 3.9, Chloride Level 107, Carbon Dioxide Level 27, Anion 

Gap 8, Blood Urea Nitrogen 13, Creatinine 0.9, Estimat Glomerular Filtration 

Rate > 60, Glucose Level 164H, Calcium Level 8.5, Phosphorus Level 2.2L, 

Magnesium Level 1.9, Total Bilirubin 0.5, Aspartate Amino Transf (AST/SGOT) 32, 

Alanine Aminotransferase (ALT/SGPT) 162H, Alkaline Phosphatase 94, Total 

Protein 6.7, Albumin 2.7L, Globulin 4.0, Albumin/Globulin Ratio 0.7L





Current Medications








 Medications


  (Trade)  Dose


 Ordered  Sig/Erasmo


 Route


 PRN Reason  Start Time


 Stop Time Status Last Admin


Dose Admin


 


 Acetaminophen


  (Tylenol)  650 mg  Q4H  PRN


 ORAL


 fever  11/5/18 13:00


 12/5/18 12:59  11/6/18 13:15


 


 


 Al Hydroxide/Mg


 Hydroxide


  (Mylanta II)  30 ml  Q6H  PRN


 ORAL


 dyspepsia  11/5/18 13:00


 12/5/18 12:59   


 


 


 Amlodipine


 Besylate


  (Norvasc)  5 mg  DAILY


 ORAL


   11/6/18 09:00


 12/6/18 08:59   


 


 


 Dextrose


  (Dextrose 50%)  25 ml  Q30M  PRN


 IV


 Hypoglycemia  11/5/18 13:15


 12/5/18 13:06   


 


 


 Dextrose


  (Dextrose 50%)  50 ml  Q30M  PRN


 IV


 hypoglycemia  11/5/18 13:15


 12/5/18 13:14   


 


 


 Dextrose/Sodium


 Chloride  1,000 ml @ 


 75 mls/hr  [id number]


 IV


   11/5/18 13:04


 12/5/18 13:03  11/7/18 00:27


 


 


 Diphenhydramine


 HCl


  (Benadryl)  25 mg  Q6H  PRN


 ORAL


 Itching/Pruritis  11/5/18 13:00


 12/5/18 12:59   


 


 


 Divalproex Sodium


  (Depakote ER)  500 mg  EVERY 12  HOURS


 ORAL


   11/5/18 21:00


 12/5/18 20:59  11/6/18 20:23


 


 


 Finasteride


  (Proscar)  5 mg  DAILY


 ORAL


   11/6/18 09:00


 12/6/18 08:59   


 


 


 Insulin Aspart


  (NovoLOG)    BEFORE MEALS AND  HS


 SUBQ


   11/5/18 16:30


 12/5/18 16:29  11/7/18 12:38


 


 


 Losartan Potassium


  (Cozaar)  50 mg  DAILY


 ORAL


   11/6/18 09:00


 12/6/18 08:59   


 


 


 Morphine Sulfate


  (Morphine


 Sulfate)  2 mg  ONCE  PRN


 IVP


 prior to HIDA  11/7/18 09:00


 11/7/18 18:00  11/7/18 09:26


 


 


 Morphine Sulfate


  (Morphine


 Sulfate)  2 mg  Q4H  PRN


 IVP


 severe  Pain (Pain Scale 7-10)  11/5/18 13:00


 11/12/18 12:59   


 


 


 Nitroglycerin


  (Ntg)  0.4 mg  Q5M X 3 DOSES PRN


 SL


 Prn Chest Pain  11/5/18 13:00


 12/5/18 12:59   


 


 


 Ondansetron HCl


  (Zofran)  4 mg  Q6H  PRN


 IVP


 Nausea & Vomiting  11/5/18 13:00


 12/5/18 12:59   


 


 


 Piperacillin Sod/


 Tazobactam Sod


 3.375 gm/Dextrose  110 ml @ 


 27.5 mls/hr  EVERY 8  HOURS


 IVPB


   11/6/18 17:00


 11/11/18 16:59  11/7/18 06:23


 


 


 Polyethylene


 Glycol


  (Miralax)  17 gm  HSPRN  PRN


 ORAL


 Constipation  11/5/18 13:00


 12/5/18 12:59   


 


 


 Quetiapine


 Fumarate


  (SEROquel)  50 mg  BID


 ORAL


   11/5/18 18:00


 12/5/18 17:59  11/6/18 17:42


 


 


 Sitagliptin


 Phosphate


  (Januvia)  100 mg  DAILY


 ORAL


   11/6/18 09:00


 12/6/18 08:59   


 


 


 Tamsulosin HCl


  (Flomax)  0.4 mg  BEDTIME


 ORAL


   11/5/18 21:00


 12/5/18 20:59  11/6/18 20:23


 


 


 Temazepam


  (Restoril)  15 mg  HSPRN  PRN


 ORAL


 Insomnia  11/5/18 13:00


 11/12/18 12:59   


 


 


 Ziprasidone


  (Geodon)  20 mg  TWICE A  DAY


 ORAL


   11/5/18 18:00


 12/5/18 17:59  11/6/18 17:42


 

















Kavya Joshi MD Nov 7, 2018 14:29

## 2018-11-07 NOTE — DIAGNOSTIC IMAGING REPORT
Indication: Dyspnea

 

Comparison:  11/5/2018

 

A single view chest radiograph was obtained.

 

Findings:

 

Cardiomediastinal appearance is within normal limits for age. The lungs are clear.

Pulmonary vascularity is appropriate. The diaphragmatic contour is smooth and

costophrenic angles are sharp. No pleural effusions are identified.

 

 

Impression: No acute findings. No change

## 2018-11-07 NOTE — GI PROGRESS NOTE
Assessment/Plan


Problems:  


(1) Cholecystitis


ICD Codes:  K81.9 - Cholecystitis, unspecified


SNOMED:  41995213


(2) Abdominal pain


ICD Codes:  R10.9 - Unspecified abdominal pain


SNOMED:  74069017


Qualifiers:  


   Qualified Codes:  R10.31 - Right lower quadrant pain


(3) Elevated LFTs


ICD Codes:  R94.5 - Abnormal results of liver function studies


SNOMED:  149791112, 737593094


(4) Fever


ICD Codes:  R50.9 - Fever, unspecified


SNOMED:  661109101


(5) Pancytopenia


ICD Codes:  D61.818 - Other pancytopenia


SNOMED:  314992124


(6) Failure to thrive


SNOMED:  56020875


Status:  stable


Status Narrative


s/p EGD SUMMARY OF FINDINGS:


1. Irregular Z-line.


2. Small hiatal hernia.


3. Gastritis, status post biopsy.


HIDA reviewed >> findings consistent with acute cholecystitis





RECOMMENDATIONS:


follow up surgical recommendations


Follow up biopsy results and treat accordingly.


The patient will need outpatient colonoscopy.





The patient was seen and examined at bedside and all new and available data was 

reviewed in the patients chart. I agree with the above findings, impression 

and plan.  (Patient seen earlier today. Signature stamp does not reflect 

patient encounter time.). - Curtis Elizondo MD





Subjective


Gastrointestinal/Abdominal:  Reports: abdominal pain





Objective





Last 24 Hour Vital Signs








  Date Time  Temp Pulse Resp B/P (MAP) Pulse Ox O2 Delivery O2 Flow Rate FiO2


 


11/7/18 09:56 100.3       


 


11/7/18 09:00      Room Air  


 


11/7/18 08:00 100.3 78 20 135/63 (87)    


 


11/7/18 04:00 99.2 76 18 110/53 (72)    


 


11/7/18 00:00 98.0 79 19 130/69 (89) 98   


 


11/6/18 21:00      Room Air  


 


11/6/18 20:00 97.8 69 18 128/72 (90) 97   


 


11/6/18 18:13 98.8       


 


11/6/18 16:00 100.2 84 18 128/58 (81) 99   


 


11/6/18 14:10 101.5       


 


11/6/18 14:00 101.5 78 18 125/70 (88) 96   


 


11/6/18 13:52  74 20  98   


 


11/6/18 13:30 100.1 80 20 151/79 98 Room Air  


 


11/6/18 13:25  79 20 152/75 99 Room Air  


 


11/6/18 13:10  78 20 149/78 100 Nasal Cannula 1 


 


11/6/18 12:55  77 20 138/80 100 Nasal Cannula 3 


 


11/6/18 12:53  76 20  99   


 


11/6/18 12:50  78 20 140/79 100 Nasal Cannula 3 


 


11/6/18 12:45 99.4 79 20 138/80 99 Nasal Cannula 3 

















Intake and Output  


 


 11/6/18 11/7/18





 19:00 07:00


 


Intake Total 1212.5 ml 


 


Output Total 300 ml 800 ml


 


Balance 912.5 ml -800 ml


 


  


 


Intake Oral 360 ml 


 


IV Total 852.5 ml 


 


Output Urine Total 300 ml 800 ml


 


# Voids 1 











Laboratory Tests








Test


  11/7/18


05:30


 


White Blood Count


  8.3 K/UL


(4.8-10.8)


 


Red Blood Count


  3.96 M/UL


(4.70-6.10)  L


 


Hemoglobin


  12.6 G/DL


(14.2-18.0)  L


 


Hematocrit


  35.5 %


(42.0-52.0)  L


 


Mean Corpuscular Volume 90 FL (80-99)  


 


Mean Corpuscular Hemoglobin


  31.8 PG


(27.0-31.0)  H


 


Mean Corpuscular Hemoglobin


Concent 35.4 G/DL


(32.0-36.0)


 


Red Cell Distribution Width


  11.2 %


(11.6-14.8)  L


 


Platelet Count


  121 K/UL


(150-450)  L


 


Mean Platelet Volume


  6.1 FL


(6.5-10.1)  L


 


Neutrophils (%) (Auto)


  73.8 %


(45.0-75.0)


 


Lymphocytes (%) (Auto)


  12.4 %


(20.0-45.0)  L


 


Monocytes (%) (Auto)


  12.2 %


(1.0-10.0)  H


 


Eosinophils (%) (Auto)


  0.7 %


(0.0-3.0)


 


Basophils (%) (Auto)


  0.9 %


(0.0-2.0)


 


Prothrombin Time


  10.0 SEC


(9.30-11.50)


 


Prothromb Time International


Ratio 0.9 (0.9-1.1)  


 


 


Activated Partial


Thromboplast Time 28 SEC (23-33)


 


 


Sodium Level


  142 MMOL/L


(136-145)


 


Potassium Level


  3.9 MMOL/L


(3.5-5.1)


 


Chloride Level


  107 MMOL/L


()


 


Carbon Dioxide Level


  27 MMOL/L


(21-32)


 


Anion Gap


  8 mmol/L


(5-15)


 


Blood Urea Nitrogen


  13 mg/dL


(7-18)


 


Creatinine


  0.9 MG/DL


(0.55-1.30)


 


Estimat Glomerular Filtration


Rate > 60 mL/min


(>60)


 


Glucose Level


  164 MG/DL


()  H


 


Calcium Level


  8.5 MG/DL


(8.5-10.1)


 


Phosphorus Level


  2.2 MG/DL


(2.5-4.9)  L


 


Magnesium Level


  1.9 MG/DL


(1.8-2.4)


 


Total Bilirubin


  0.5 MG/DL


(0.2-1.0)


 


Aspartate Amino Transf


(AST/SGOT) 32 U/L (15-37)


 


 


Alanine Aminotransferase


(ALT/SGPT) 162 U/L


(12-78)  H


 


Alkaline Phosphatase


  94 U/L


()


 


Total Protein


  6.7 G/DL


(6.4-8.2)


 


Albumin


  2.7 G/DL


(3.4-5.0)  L


 


Globulin 4.0 g/dL  


 


Albumin/Globulin Ratio


  0.7 (1.0-2.7)


L











Microbiology








 Date/Time


Source Procedure


Growth Status


 


 


 11/6/18 17:30


Urine,Clean Catch Urine Culture - Preliminary


NO GROWTH Resulted








Height (Feet):  6


Height (Inches):  2.00


Weight (Pounds):  204


General Appearance:  WD/WN, no apparent distress, alert


Cardiovascular:  normal rate


Respiratory/Chest:  normal breath sounds, no respiratory distress


Abdominal Exam:  normal bowel sounds, non tender, soft


Extremities:  normal range of motion, non-tender











Silvino Jones NP Nov 7, 2018 12:31

## 2018-11-07 NOTE — DIAGNOSTIC IMAGING REPORT
Indication: Abdominal Pain

 

COMPARISON: Ultrasound 11/5/2018

 

Technique: 5.5 mCi of technetium 99 m-Choletec was injected intravenously. Planar

imaging of the abdomen was then performed every 5 minutes up to 30 minutes and every

10 minutes up to one hour. Oblique views were also obtained.  2 mg morphine given at

60 minutes.

 

Findings: There is prompt uptake within the liver with good washout of radiotracer

from the liver on subsequent imaging. There is excretion into the biliary ducts.

 

There is no gallbladder activity during earlier dynamic imaging nor on the delayed

images. Findings consistent with cystic duct obstruction and acute cholecystitis

given presence of gallstones on recent ultrasound 11/5/2018.

 

Bowel activity is demonstrated in a timely fashion indicating patency of the common

bile duct.

 

Impression: Cystic duct obstruction. Findings consistent with acute cholecystitis

## 2018-11-07 NOTE — GENERAL SURGERY PROGRESS NOTE
General Surgery-Progress Note


Subjective


Additional Comments


doing well. HIDA positive





Objective





Last 24 Hour Vital Signs








  Date Time  Temp Pulse Resp B/P (MAP) Pulse Ox O2 Delivery O2 Flow Rate FiO2


 


11/7/18 16:00 98.8 77 20 136/66 (89) 96   


 


11/7/18 12:00 97.9 72 20 136/66 (89) 97   


 


11/7/18 09:56 100.3       


 


11/7/18 09:00      Room Air  


 


11/7/18 08:00 100.3 78 20 135/63 (87)    


 


11/7/18 04:00 99.2 76 18 110/53 (72)    


 


11/7/18 00:00 98.0 79 19 130/69 (89) 98   


 


11/6/18 21:00      Room Air  


 


11/6/18 20:00 97.8 69 18 128/72 (90) 97   


 


11/6/18 18:13 98.8       








I&O











Intake and Output  


 


 11/6/18 11/7/18





 19:00 07:00


 


Intake Total 1212.5 ml 


 


Output Total 300 ml 800 ml


 


Balance 912.5 ml -800 ml


 


  


 


Intake Oral 360 ml 


 


IV Total 852.5 ml 


 


Output Urine Total 300 ml 800 ml


 


# Voids 1 








Drains:  none


Cardiovascular:  RSR


Respiratory:  clear


Abdomen:  soft, flat, non-tender, present bowel sounds


Extremities:  no cyanosis





Laboratory Tests








Test


  11/7/18


05:30


 


White Blood Count


  8.3 K/UL


(4.8-10.8)


 


Red Blood Count


  3.96 M/UL


(4.70-6.10)  L


 


Hemoglobin


  12.6 G/DL


(14.2-18.0)  L


 


Hematocrit


  35.5 %


(42.0-52.0)  L


 


Mean Corpuscular Volume 90 FL (80-99)  


 


Mean Corpuscular Hemoglobin


  31.8 PG


(27.0-31.0)  H


 


Mean Corpuscular Hemoglobin


Concent 35.4 G/DL


(32.0-36.0)


 


Red Cell Distribution Width


  11.2 %


(11.6-14.8)  L


 


Platelet Count


  121 K/UL


(150-450)  L


 


Mean Platelet Volume


  6.1 FL


(6.5-10.1)  L


 


Neutrophils (%) (Auto)


  73.8 %


(45.0-75.0)


 


Lymphocytes (%) (Auto)


  12.4 %


(20.0-45.0)  L


 


Monocytes (%) (Auto)


  12.2 %


(1.0-10.0)  H


 


Eosinophils (%) (Auto)


  0.7 %


(0.0-3.0)


 


Basophils (%) (Auto)


  0.9 %


(0.0-2.0)


 


Prothrombin Time


  10.0 SEC


(9.30-11.50)


 


Prothromb Time International


Ratio 0.9 (0.9-1.1)  


 


 


Activated Partial


Thromboplast Time 28 SEC (23-33)


 


 


Sodium Level


  142 MMOL/L


(136-145)


 


Potassium Level


  3.9 MMOL/L


(3.5-5.1)


 


Chloride Level


  107 MMOL/L


()


 


Carbon Dioxide Level


  27 MMOL/L


(21-32)


 


Anion Gap


  8 mmol/L


(5-15)


 


Blood Urea Nitrogen


  13 mg/dL


(7-18)


 


Creatinine


  0.9 MG/DL


(0.55-1.30)


 


Estimat Glomerular Filtration


Rate > 60 mL/min


(>60)


 


Glucose Level


  164 MG/DL


()  H


 


Calcium Level


  8.5 MG/DL


(8.5-10.1)


 


Phosphorus Level


  2.2 MG/DL


(2.5-4.9)  L


 


Magnesium Level


  1.9 MG/DL


(1.8-2.4)


 


Total Bilirubin


  0.5 MG/DL


(0.2-1.0)


 


Aspartate Amino Transf


(AST/SGOT) 32 U/L (15-37)


 


 


Alanine Aminotransferase


(ALT/SGPT) 162 U/L


(12-78)  H


 


Alkaline Phosphatase


  94 U/L


()


 


Total Protein


  6.7 G/DL


(6.4-8.2)


 


Albumin


  2.7 G/DL


(3.4-5.0)  L


 


Globulin 4.0 g/dL  


 


Albumin/Globulin Ratio


  0.7 (1.0-2.7)


L











Plan


Problems:  


(1) Abdominal pain


Assessment & Plan:  abdominal pain.  generalized but mainly upper and to the 

right.  +N/V


since resolving now


no leukocytosis. 


abnormal LFT's


US with stones


CT reviewed


HIDA positive 





discussed findings with patient.  offered lap tunde vs non surgical management.

  patient expressed that he would prefer surgery after all risks, benefits, and 

alternatives discussed





npo p mn


iv fluids


iv abx


consent


OR tomorrow for lap vs open tunde 





-will follow with recs


thank you for this consultation














Bandar Kellogg Nov 7, 2018 17:10

## 2018-11-07 NOTE — GENERAL PROGRESS NOTE
Assessment/Plan


Problem List:  


(1) HTN (hypertension)


ICD Codes:  I10 - Essential (primary) hypertension


SNOMED:  22976286


(2) Diabetes


ICD Codes:  E11.9 - Type 2 diabetes mellitus without complications


SNOMED:  17970458


(3) COPD (chronic obstructive pulmonary disease)


ICD Codes:  J44.9 - Chronic obstructive pulmonary disease, unspecified


SNOMED:  57013170


(4) Psychiatric disorder


ICD Codes:  F99 - Mental disorder, not otherwise specified


SNOMED:  67919449, 588101507


(5) Parkinson disease


ICD Codes:  G20 - Parkinson's disease


SNOMED:  41247835


(6) UGI bleed


ICD Codes:  K92.2 - Gastrointestinal hemorrhage, unspecified


SNOMED:  95143477


(7) Weak


ICD Codes:  R53.1 - Weakness


SNOMED:  76403983


(8) Abdominal pain


ICD Codes:  R10.9 - Unspecified abdominal pain


SNOMED:  52885687


Qualifiers:  


   Qualified Codes:  R10.31 - Right lower quadrant pain


Status:  stable, progressing


Assessment/Plan


ot pt diet gi f/u dc if clear





Subjective


Constitutional:  Reports: weakness


Allergies:  


Coded Allergies:  


     No Known Allergies (Unverified , 1/27/18)


All Systems:  reviewed and negative except above


Subjective


calm in bed eating





Objective





Last 24 Hour Vital Signs








  Date Time  Temp Pulse Resp B/P (MAP) Pulse Ox O2 Delivery O2 Flow Rate FiO2


 


11/7/18 12:00 97.9 72 20 136/66 (89)    


 


11/7/18 09:56 100.3       


 


11/7/18 09:00      Room Air  


 


11/7/18 08:00 100.3 78 20 135/63 (87)    


 


11/7/18 04:00 99.2 76 18 110/53 (72)    


 


11/7/18 00:00 98.0 79 19 130/69 (89) 98   


 


11/6/18 21:00      Room Air  


 


11/6/18 20:00 97.8 69 18 128/72 (90) 97   


 


11/6/18 18:13 98.8       


 


11/6/18 16:00 100.2 84 18 128/58 (81) 99   


 


11/6/18 14:10 101.5       


 


11/6/18 14:00 101.5 78 18 125/70 (88) 96   


 


11/6/18 13:52  74 20  98   


 


11/6/18 13:30 100.1 80 20 151/79 98 Room Air  

















Intake and Output  


 


 11/6/18 11/7/18





 19:00 07:00


 


Intake Total 1212.5 ml 


 


Output Total 300 ml 800 ml


 


Balance 912.5 ml -800 ml


 


  


 


Intake Oral 360 ml 


 


IV Total 852.5 ml 


 


Output Urine Total 300 ml 800 ml


 


# Voids 1 








Laboratory Tests


11/7/18 05:30: 


White Blood Count 8.3, Red Blood Count 3.96L, Hemoglobin 12.6L, Hematocrit 35.5L

, Mean Corpuscular Volume 90, Mean Corpuscular Hemoglobin 31.8H, Mean 

Corpuscular Hemoglobin Concent 35.4, Red Cell Distribution Width 11.2L, 

Platelet Count 121L, Mean Platelet Volume 6.1L, Neutrophils (%) (Auto) 73.8, 

Lymphocytes (%) (Auto) 12.4L, Monocytes (%) (Auto) 12.2H, Eosinophils (%) (Auto

) 0.7, Basophils (%) (Auto) 0.9, Prothrombin Time 10.0, Prothromb Time 

International Ratio 0.9, Activated Partial Thromboplast Time 28, Sodium Level 

142, Potassium Level 3.9, Chloride Level 107, Carbon Dioxide Level 27, Anion 

Gap 8, Blood Urea Nitrogen 13, Creatinine 0.9, Estimat Glomerular Filtration 

Rate > 60, Glucose Level 164H, Calcium Level 8.5, Phosphorus Level 2.2L, 

Magnesium Level 1.9, Total Bilirubin 0.5, Aspartate Amino Transf (AST/SGOT) 32, 

Alanine Aminotransferase (ALT/SGPT) 162H, Alkaline Phosphatase 94, Total 

Protein 6.7, Albumin 2.7L, Globulin 4.0, Albumin/Globulin Ratio 0.7L


Height (Feet):  6


Height (Inches):  2.00


Weight (Pounds):  204


General Appearance:  lethargic


EENT:  normal ENT inspection


Neck:  normal alignment


Cardiovascular:  normal peripheral pulses, normal rate, regular rhythm


Respiratory/Chest:  chest wall non-tender, lungs clear, normal breath sounds


Abdomen:  normal bowel sounds, non tender, soft


Extremities:  normal inspection


Edema:  no edema noted Arm (L), no edema noted Arm (R), no edema noted Leg (L), 

no edema noted Leg (R), no edema noted Pedal (L), no edema noted Pedal (R), no 

edema noted Generalized


Neurologic:  responsive, motor weakness


Skin:  normal pigmentation, warm/dry











Jean-Pierre Henderson DO Nov 7, 2018 13:28

## 2018-11-07 NOTE — DIAGNOSTIC IMAGING REPORT
--------------- APPROVED REPORT --------------





CPT Code: 49239



Present Symptoms

Comments: Pain





BILATERAL: Imaging reveals a patent deep venous system bilaterally. There is no evidence 

of thrombus within the femoral, popliteal or tibial segments. The greater saphenous veins 

are also within normal limits. Doppler indicates normal spontaneous flow within these 

segments.

## 2018-11-07 NOTE — PRE-PROCEDURE NOTE/ATTESTATION
Pre-Procedure Note/Attestation


Complete Prior to Procedure


Planned Procedure:  not applicable


Procedure Narrative:


laparoscopic cholecystectomy; possible open





Indications for Procedure


Pre-Operative Diagnosis:


acute cholecystitis





Attestation


I attest that I discussed the nature of the procedure; its benefits; risks and 

complications; and alternatives (and the risks and benefits of such alternatives

), prior to the procedure, with the patient (or the patient's legal 

representative).





I attest that, if there was a reasonable possibility of needing a blood 

transfusion, the patient (or the patient's legal representative) was given the 

[facility] standardized written summary, pursuant 

to the [place] Blood Safety Act ([place] Health and Safety Code # 1645, as 

amended).





I attest that I re-evaluated the patient just prior to the surgery and that 

there has been no change in the patient's H&P, except as documented below:











Bandar Kellogg Nov 7, 2018 17:11

## 2018-11-08 VITALS — SYSTOLIC BLOOD PRESSURE: 123 MMHG | DIASTOLIC BLOOD PRESSURE: 91 MMHG

## 2018-11-08 VITALS — SYSTOLIC BLOOD PRESSURE: 154 MMHG | DIASTOLIC BLOOD PRESSURE: 54 MMHG

## 2018-11-08 VITALS — DIASTOLIC BLOOD PRESSURE: 62 MMHG | SYSTOLIC BLOOD PRESSURE: 123 MMHG

## 2018-11-08 VITALS — SYSTOLIC BLOOD PRESSURE: 123 MMHG | DIASTOLIC BLOOD PRESSURE: 92 MMHG

## 2018-11-08 VITALS — DIASTOLIC BLOOD PRESSURE: 80 MMHG | SYSTOLIC BLOOD PRESSURE: 129 MMHG

## 2018-11-08 VITALS — SYSTOLIC BLOOD PRESSURE: 152 MMHG | DIASTOLIC BLOOD PRESSURE: 67 MMHG

## 2018-11-08 VITALS — DIASTOLIC BLOOD PRESSURE: 70 MMHG | SYSTOLIC BLOOD PRESSURE: 148 MMHG

## 2018-11-08 VITALS — DIASTOLIC BLOOD PRESSURE: 71 MMHG | SYSTOLIC BLOOD PRESSURE: 151 MMHG

## 2018-11-08 VITALS — DIASTOLIC BLOOD PRESSURE: 72 MMHG | SYSTOLIC BLOOD PRESSURE: 148 MMHG

## 2018-11-08 VITALS — SYSTOLIC BLOOD PRESSURE: 155 MMHG | DIASTOLIC BLOOD PRESSURE: 67 MMHG

## 2018-11-08 VITALS — DIASTOLIC BLOOD PRESSURE: 83 MMHG | SYSTOLIC BLOOD PRESSURE: 156 MMHG

## 2018-11-08 VITALS — DIASTOLIC BLOOD PRESSURE: 82 MMHG | SYSTOLIC BLOOD PRESSURE: 167 MMHG

## 2018-11-08 VITALS — DIASTOLIC BLOOD PRESSURE: 62 MMHG | SYSTOLIC BLOOD PRESSURE: 129 MMHG

## 2018-11-08 VITALS — DIASTOLIC BLOOD PRESSURE: 73 MMHG | SYSTOLIC BLOOD PRESSURE: 148 MMHG

## 2018-11-08 LAB
ADD MANUAL DIFF: NO
ANION GAP SERPL CALC-SCNC: 6 MMOL/L (ref 5–15)
APTT BLD: 27 SEC (ref 23–33)
BASOPHILS NFR BLD AUTO: 0.9 % (ref 0–2)
BUN SERPL-MCNC: 14 MG/DL (ref 7–18)
CALCIUM SERPL-MCNC: 8.2 MG/DL (ref 8.5–10.1)
CHLORIDE SERPL-SCNC: 108 MMOL/L (ref 98–107)
CO2 SERPL-SCNC: 29 MMOL/L (ref 21–32)
CREAT SERPL-MCNC: 1.1 MG/DL (ref 0.55–1.3)
EOSINOPHIL NFR BLD AUTO: 1.3 % (ref 0–3)
ERYTHROCYTE [DISTWIDTH] IN BLOOD BY AUTOMATED COUNT: 11.3 % (ref 11.6–14.8)
HCT VFR BLD CALC: 35.7 % (ref 42–52)
HGB BLD-MCNC: 12.3 G/DL (ref 14.2–18)
INR PPP: 1 (ref 0.9–1.1)
LYMPHOCYTES NFR BLD AUTO: 12.3 % (ref 20–45)
MCV RBC AUTO: 90 FL (ref 80–99)
MONOCYTES NFR BLD AUTO: 12.3 % (ref 1–10)
NEUTROPHILS NFR BLD AUTO: 73.2 % (ref 45–75)
PLATELET # BLD: 125 K/UL (ref 150–450)
POTASSIUM SERPL-SCNC: 4.3 MMOL/L (ref 3.5–5.1)
RBC # BLD AUTO: 3.95 M/UL (ref 4.7–6.1)
SODIUM SERPL-SCNC: 143 MMOL/L (ref 136–145)
WBC # BLD AUTO: 7 K/UL (ref 4.8–10.8)

## 2018-11-08 PROCEDURE — 0FT44ZZ RESECTION OF GALLBLADDER, PERCUTANEOUS ENDOSCOPIC APPROACH: ICD-10-PCS

## 2018-11-08 PROCEDURE — 0WQF4ZZ REPAIR ABDOMINAL WALL, PERCUTANEOUS ENDOSCOPIC APPROACH: ICD-10-PCS

## 2018-11-08 RX ADMIN — ZIPRASIDONE HYDROCHLORIDE SCH MG: 20 CAPSULE ORAL at 09:00

## 2018-11-08 RX ADMIN — INSULIN ASPART SCH UNITS: 100 INJECTION, SOLUTION INTRAVENOUS; SUBCUTANEOUS at 16:34

## 2018-11-08 RX ADMIN — DEXTROSE MONOHYDRATE SCH MLS/HR: 50 INJECTION, SOLUTION INTRAVENOUS at 21:59

## 2018-11-08 RX ADMIN — MORPHINE SULFATE PRN MG: 4 INJECTION INTRAVENOUS at 20:42

## 2018-11-08 RX ADMIN — DIVALPROEX SODIUM SCH MG: 500 TABLET, FILM COATED, EXTENDED RELEASE ORAL at 09:00

## 2018-11-08 RX ADMIN — DEXTROSE AND SODIUM CHLORIDE SCH MLS/HR: 5; .45 INJECTION, SOLUTION INTRAVENOUS at 23:15

## 2018-11-08 RX ADMIN — MORPHINE SULFATE PRN MG: 4 INJECTION INTRAVENOUS at 16:03

## 2018-11-08 RX ADMIN — INSULIN ASPART SCH UNITS: 100 INJECTION, SOLUTION INTRAVENOUS; SUBCUTANEOUS at 06:00

## 2018-11-08 RX ADMIN — TAMSULOSIN HYDROCHLORIDE SCH MG: 0.4 CAPSULE ORAL at 20:42

## 2018-11-08 RX ADMIN — INSULIN ASPART SCH UNITS: 100 INJECTION, SOLUTION INTRAVENOUS; SUBCUTANEOUS at 20:53

## 2018-11-08 RX ADMIN — DEXTROSE AND SODIUM CHLORIDE SCH MLS/HR: 5; .45 INJECTION, SOLUTION INTRAVENOUS at 06:10

## 2018-11-08 RX ADMIN — DEXTROSE MONOHYDRATE SCH MLS/HR: 50 INJECTION, SOLUTION INTRAVENOUS at 14:34

## 2018-11-08 RX ADMIN — DEXTROSE MONOHYDRATE SCH MLS/HR: 50 INJECTION, SOLUTION INTRAVENOUS at 05:59

## 2018-11-08 RX ADMIN — DOCUSATE SODIUM SCH MG: 100 CAPSULE, LIQUID FILLED ORAL at 17:26

## 2018-11-08 RX ADMIN — ZIPRASIDONE HYDROCHLORIDE SCH MG: 20 CAPSULE ORAL at 17:26

## 2018-11-08 RX ADMIN — INSULIN ASPART SCH UNITS: 100 INJECTION, SOLUTION INTRAVENOUS; SUBCUTANEOUS at 11:30

## 2018-11-08 RX ADMIN — DIVALPROEX SODIUM SCH MG: 500 TABLET, FILM COATED, EXTENDED RELEASE ORAL at 20:42

## 2018-11-08 RX ADMIN — LOSARTAN POTASSIUM SCH MG: 50 TABLET, FILM COATED ORAL at 09:00

## 2018-11-08 NOTE — PULMONOLOGY PROGRESS NOTE
Assessment/Plan


Problems:  


(1) Abdominal pain


(2) COPD (chronic obstructive pulmonary disease)


(3) CAD (coronary artery disease)


(4) Psychiatric disorder


(5) Parkinson disease


(6) Failure to thrive


Assessment/Plan


ate better


no new complains


all reviewed


iv fluids


check electrolytes


symptomatic treatment


respiratory treatment





Subjective


ROS Limited/Unobtainable:  No


Constitutional:  Reports: no symptoms


HEENT:  Repors: no symptoms


Respiratory:  Reports: no symptoms


Allergies:  


Coded Allergies:  


     No Known Allergies (Unverified , 1/27/18)





Objective





Last 24 Hour Vital Signs








  Date Time  Temp Pulse Resp B/P (MAP) Pulse Ox O2 Delivery O2 Flow Rate FiO2


 


11/8/18 09:00      Room Air  


 


11/8/18 08:00 97.7 74 19 123/91 (102) 97   


 


11/8/18 04:00 97.7 66 19 123/62 (82) 95   


 


11/8/18 00:00 99.1 68 19 129/62 (84) 96   


 


11/7/18 21:00      Room Air  


 


11/7/18 20:00 98.8 75 19 114/50 (71) 96   


 


11/7/18 16:00 98.8 77 20 136/66 (89) 96   

















Intake and Output  


 


 11/7/18 11/8/18





 19:00 07:00


 


Intake Total 1275 ml 


 


Output Total 700 ml 600 ml


 


Balance 575 ml -600 ml


 


  


 


Intake Oral 600 ml 


 


IV Total 675 ml 


 


Output Urine Total 700 ml 600 ml








General Appearance:  WD/WN


HEENT:  normocephalic


Respiratory/Chest:  chest wall non-tender, lungs clear


Cardiovascular:  normal peripheral pulses, normal rate


Genitourinary:  normal external genitalia


Extremities:  no clubbing


Skin:  no rash





Microbiology








 Date/Time


Source Procedure


Growth Status


 


 


 11/6/18 17:25


Blood Blood Culture - Preliminary


NO GROWTH AFTER 24 HOURS Resulted


 


 11/6/18 17:10


Blood Blood Culture - Preliminary


NO GROWTH AFTER 24 HOURS Resulted


 


 11/6/18 17:30


Urine,Clean Catch Urine Culture - Preliminary


Mixed Gram Positive Organism Resulted








Laboratory Tests


11/8/18 05:50: 


White Blood Count 7.0, Red Blood Count 3.95L, Hemoglobin 12.3L, Hematocrit 35.7L

, Mean Corpuscular Volume 90, Mean Corpuscular Hemoglobin 31.1H, Mean 

Corpuscular Hemoglobin Concent 34.4, Red Cell Distribution Width 11.3L, 

Platelet Count 125L, Mean Platelet Volume 6.7, Neutrophils (%) (Auto) 73.2, 

Lymphocytes (%) (Auto) 12.3L, Monocytes (%) (Auto) 12.3H, Eosinophils (%) (Auto

) 1.3, Basophils (%) (Auto) 0.9, Prothrombin Time 10.1, Prothromb Time 

International Ratio 1.0, Activated Partial Thromboplast Time 27, Sodium Level 

143, Potassium Level 4.3, Chloride Level 108H, Carbon Dioxide Level 29, Anion 

Gap 6, Blood Urea Nitrogen 14, Creatinine 1.1, Estimat Glomerular Filtration 

Rate > 60, Glucose Level 158H, Calcium Level 8.2L





Current Medications








 Medications


  (Trade)  Dose


 Ordered  Sig/Erasmo


 Route


 PRN Reason  Start Time


 Stop Time Status Last Admin


Dose Admin


 


 Acetaminophen


  (Tylenol)  650 mg  Q4H  PRN


 ORAL


 fever  11/5/18 13:00


 12/5/18 12:59  11/6/18 13:15


 


 


 Al Hydroxide/Mg


 Hydroxide


  (Mylanta II)  30 ml  Q6H  PRN


 ORAL


 dyspepsia  11/5/18 13:00


 12/5/18 12:59   


 


 


 Amlodipine


 Besylate


  (Norvasc)  5 mg  DAILY


 ORAL


   11/6/18 09:00


 12/6/18 08:59   


 


 


 Dextrose


  (Dextrose 50%)  25 ml  Q30M  PRN


 IV


 Hypoglycemia  11/5/18 13:15


 12/5/18 13:06   


 


 


 Dextrose


  (Dextrose 50%)  50 ml  Q30M  PRN


 IV


 hypoglycemia  11/5/18 13:15


 12/5/18 13:14   


 


 


 Dextrose/Sodium


 Chloride  1,000 ml @ 


 75 mls/hr  [id number]


 IV


   11/5/18 13:04


 12/5/18 13:03  11/8/18 06:10


 


 


 Diphenhydramine


 HCl


  (Benadryl)  25 mg  Q15M  PRN


 IVP


 Itching  11/8/18 11:15


 11/8/18 18:00   


 


 


 Diphenhydramine


 HCl


  (Benadryl)  25 mg  Q6H  PRN


 ORAL


 Itching/Pruritis  11/5/18 13:00


 12/5/18 12:59   


 


 


 Divalproex Sodium


  (Depakote ER)  500 mg  EVERY 12  HOURS


 ORAL


   11/5/18 21:00


 12/5/18 20:59  11/7/18 20:28


 


 


 Finasteride


  (Proscar)  5 mg  DAILY


 ORAL


   11/6/18 09:00


 12/6/18 08:59   


 


 


 Hydralazine HCl


  (Apresoline)  5 mg  Q30M  PRN


 IV


 SBP>160 OR___/DBP>90 OR___  11/8/18 11:15


 11/8/18 18:00   


 


 


 Hydromorphone HCl


  (Dilaudid)  0.5 mg  Q15M  PRN


 IVP


 Severe Pain (Pain Scale 7-10)  11/8/18 11:15


 11/8/18 18:00   


 


 


 Insulin Aspart


  (NovoLOG)    BEFORE MEALS AND  HS


 SUBQ


   11/5/18 16:30


 12/5/18 16:29  11/8/18 06:00


 


 


 Lorazepam


  (Ativan)  1 mg  Q6H  PRN


 ORAL


 For Anxiety  11/8/18 03:45


 11/15/18 03:44   


 


 


 Losartan Potassium


  (Cozaar)  50 mg  DAILY


 ORAL


   11/6/18 09:00


 12/6/18 08:59   


 


 


 Morphine Sulfate


  (Morphine


 Sulfate)  2 mg  Q4H  PRN


 IVP


 severe  Pain (Pain Scale 7-10)  11/5/18 13:00


 11/12/18 12:59   


 


 


 Nitroglycerin


  (Ntg)  0.4 mg  Q5M X 3 DOSES PRN


 SL


 Prn Chest Pain  11/5/18 13:00


 12/5/18 12:59   


 


 


 Ondansetron HCl


  (Zofran)  4 mg  Q6H  PRN


 IVP


 Nausea & Vomiting  11/5/18 13:00


 12/5/18 12:59   


 


 


 Piperacillin Sod/


 Tazobactam Sod


 3.375 gm/Dextrose  110 ml @ 


 27.5 mls/hr  EVERY 8  HOURS


 IVPB


   11/6/18 17:00


 11/11/18 16:59  11/8/18 05:59


 


 


 Polyethylene


 Glycol


  (Miralax)  17 gm  HSPRN  PRN


 ORAL


 Constipation  11/5/18 13:00


 12/5/18 12:59   


 


 


 Quetiapine


 Fumarate


  (SEROquel)  50 mg  BID


 ORAL


   11/5/18 18:00


 12/5/18 17:59  11/7/18 17:17


 


 


 Sitagliptin


 Phosphate


  (Januvia)  100 mg  DAILY


 ORAL


   11/6/18 09:00


 12/6/18 08:59   


 


 


 Tamsulosin HCl


  (Flomax)  0.4 mg  BEDTIME


 ORAL


   11/5/18 21:00


 12/5/18 20:59  11/7/18 20:28


 


 


 Temazepam


  (Restoril)  15 mg  HSPRN  PRN


 ORAL


 Insomnia  11/5/18 13:00


 11/12/18 12:59   


 


 


 Ziprasidone


  (Geodon)  20 mg  TWICE A  DAY


 ORAL


   11/5/18 18:00


 12/5/18 17:59  11/7/18 17:16


 

















Kavya Joshi MD Nov 8, 2018 12:02

## 2018-11-08 NOTE — BRIEF OPERATIVE NOTE
Immediate Post Operative Note


Operative Note


Pre-op Diagnosis:


acute cholecystitis


Procedure:


1. lap tunde


2. umbilical hernia repair


Post-op Diagnosis:  same as pre-op


Surgeon:  elaine


Anesthesiologist:  JERALD Leon


Anesthesia:  general


Specimen:  yes


Complications:  none


Condition:  stable


Fluids:  see records


Estimated Blood Loss:  minimal


Drains:  none


Implant(s) used?:  No











Bandar Kellogg Nov 8, 2018 13:49

## 2018-11-08 NOTE — OPERATIVE NOTE - DICTATED
DATE OF OPERATION:  11/08/2018

PREOPERATIVE DIAGNOSES:

1. Acute cholecystitis.



POSTOPERATIVE DIAGNOSES:

1. Acute cholecystitis.

2. Umbilical hernia.



OPERATION PERFORMED:

1. Laparoscopic cholecystectomy.

2. Umbilical hernia repair.



ATTENDING SURGEON:  Bandar Kellogg M.D.



ASSISTANT:  None.



ANESTHESIOLOGIST:  Carolyn Duarte CRNA.



ANESTHESIA:  General GTA.



ESTIMATED BLOOD LOSS:  Minimal.



IV FLUIDS:  Please see anesthesia records.



COMPLICATIONS:  None.



DRAINS:  None.



WOUND CLASSIFICATION:  Class III.



COUNTS:  Sponge and needle count correct x2.



IMPLANTS:  None.



COMPLICATIONS:  None.



INDICATIONS FOR PROCEDURE:  This is a 68-year-old male who presented to the

emergency room to the emergency room at [facility] complaining

of acute onset of right upper quadrant abdominal pain with associated

nausea and emesis.  On admission, he was noted to have right upper

quadrant tenderness/McBurney's point tenderness with elevated AST, ALT,

and alkaline phosphatase of 202, 465, and 159 respectively.  Lipase was

normal and otherwise the patient was well.  The patient was admitted for

care and management at which time had a CT scan, which identified

cholelithiasis, followed by an ultrasound, which also identified

cholelithiasis and subsequently a HIDA scan, which identified cystic duct

obstruction consistent with acute cholecystitis.  Fortunately with medical

therapy, the patient's symptoms improved, but given the imaging findings,

cholecystectomy was indicated and recommended.  Risks, benefits,

alternatives were discussed with the patient in detail, who expressed

understanding and consented for surgery.  Of note, the patient was noted

to have an umbilical hernia, which likely would need to be repaired

depending on surgical technique.



OPERATIVE NOTE:  The patient taken to the operating room and placed on the

operating room table in supine position with bilateral arms tucked.  All

bony prominences well padded.  SCDs were placed.  Preoperative time-out

was taken identifying the patient, procedure, operative staff, and

surgical staff.  No Nina was inserted given the patient has voided prior

to entering the operating room.  A 2 g Ancef IV were given one hour prior

to cut time.  General anesthesia was induced.  The patient was intubated.

The abdomen was clipped, prepped, and draped in standard surgical fashion.

The umbilical hernia was identified.  An umbilical incision was made

using a #11 scalpel.  Omental contents were noted in the hernia and the

defect was identified.  The umbilical defect was used to enter the abdomen

and a 12 mm Brianna trocar was inserted and the abdomen was insufflated to

12 to 15 mmHg.  Upon entering the laparoscope, there were significant

fatty tissues around and no significant visualization could be noted.  The

laparoscope was then removed and it was identified that likely with the

chronicity of the umbilical hernia omental contents had formed the

adhesions circumferentially around the umbilicus at the peritoneal lining.

Blunt dissection was carried down until a window was made and the trocar

was then re-inserted and the abdomen was insufflated.  Secondary trocars

were then placed under direct visualization beginning with a 12 mm right

epigastric subxiphoid port followed by two 5 mm right subcostal ports.

The epigastric port site was used for the laparoscope and the umbilical

port site was evaluated.  Omental adhesions were noted circumferentially

around the umbilical hernia defect.  Laparoscopic marlin/scissors were

then used to sharply dissect down the omentum from its adhesions to the

peritoneum.  Once this was complete, electrocautery was used for

hemostasis as necessary.  The remainder of the umbilical hernia was

identified and left alone.  At this time, the omentum was allowed to fall

into the back to its anatomical position.  The right and left lower

quadrants were identified and noted to be normal.  The pelvis was

identified and noted to be normal.  The patient was then placed in reverse

Trendelenburg position with left side down.  The liver was identified and

noted to be healthy, but with some blunted edges.  The gallbladder was

identified and noted to be severely diseased with acute cholecystitis and

it was very distended and with a significant thickened wall.  It could not

be grasped with graspers and therefore decompression was necessary.  A

laparoscopic drainage needle was then inserted and the gallbladder was

decompressed.  Serous fluid was evacuated.  It was not bilious in nature,

indicative of hydrops of the gallbladder.  Once approximately 100 mL was

evacuated, the decompressed gallbladder was able to be grasped using most

lateral port and retracted over the liver.  At the infundibulum, there was

a large stone impacted at the infundibulum completely obstructing the

cystic duct.  The cystic duct was seen with attachments at the

infundibulum were taken down and the cystic artery and duct were then

identified, visualized and circumferentially dissected out.  Once the

critical view was obtained, the cystic artery was doubly clipped and

divided.  The only remaining structure entering into the gallbladder was

the cystic duct.  The cystic duct was very short, approximately a

centimeter in length and the common duct could be easily identified

distally.  Care was taken to ensure no injury to the common duct.  The

cystic duct was then doubly clipped and divided.  The gallbladder was then

taken off its liver attachments using electrocautery.  Of note, there was

significant thickening of the circumferential gallbladder wall with

significant pericholecystic edema at the liver bed.  Once this was

completed, the gallbladder was placed in an endoscopic retrieval bag.

Hemostasis was achieved with electrocautery.  The right upper quadrant was

irrigated and suctioned.  The cystic duct and artery stumps were

identified and noted to be intact without leakage of bile or bleeding.  A

Surgicel was placed in the liver bed.  At this time, the gallbladder was

to be removed from the abdomen using the umbilical trocar site.  Using the

endoscopic shaver, initial attempt was made to remove the gallbladder, but

given the size of the stone, which was significantly larger, the fascial

incision was extended.  The gallbladder and stone were then removed and

sent to pathology for review.  At this time, we began the conclusion of

our procedure.  Secondary trocars were removed under direct visualization.

Local anesthetic was infiltrated throughout the procedure through all

proposed incision sites.  The fascia of the subxiphoid 12 mm port was

closed using a figure-of-eight Vicryl suture.  The edges of the umbilical

hernia defect were freshened up and following this, the umbilical hernia

defect was closed using multiple 0 Prolene figure-of-eight sutures.  Once

appropriate repair was identified, all wounds were cleansed and skin

incisions were closed using 4-0 Monocryl subcuticular interrupted sutures.

Skin glue and dressings were applied.  The patient tolerated the

procedure well, was taken to postanesthetic care unit in stable condition.

No immediate postoperative complications noted.









  ______________________________________________

  Bandar Kellogg M.D.





DR:  DARIN

D:  11/08/2018 13:59

T:  11/08/2018 20:04

JOB#:  [id number]

CC:



BRISA

## 2018-11-08 NOTE — INFECTIOUS DISEASES PROG NOTE
Assessment/Plan


Assessment/Plan





ASSESSMENT:  The patient is a 68-year-old male with:











Fever, Sp 


Normal white blood cells.


Acute cholecystitis.


Transaminitis, improving.


   Hepatitis panel:  unremarkable


   HIDA scan Cystic duct: obstruction, suggestive of acute cholecystitis


   Ultrasound of the abdomen : Cholelithiasis 








EGD : Gastritis  11/06/2018.


Diabetes.


Schizoaffective disorder.


COPD.


Parkinson disease.


Failure to thrive.


History of left arm surgery











PLAN:





continue the patient on IV Zosyn day #2.


Monitor CBC.


Monitor BMP.


Monitor liver function tests


Monitor cultures (blood, urine)


Surgical followup for cholecystectomy.


GI followup





Subjective


Constitutional:  Denies: no symptoms, fever, chills, fatigue, anorexia, 

drenching sweats, other


Allergies:  


Coded Allergies:  


     No Known Allergies (Unverified , 1/27/18)





Objective


Vital Signs





Last 24 Hour Vital Signs








  Date Time  Temp Pulse Resp B/P (MAP) Pulse Ox O2 Delivery O2 Flow Rate FiO2


 


11/8/18 09:00      Room Air  


 


11/8/18 08:00 97.7 74 19 123/91 (102) 97   


 


11/8/18 04:00 97.7 66 19 123/62 (82) 95   


 


11/8/18 00:00 99.1 68 19 129/62 (84) 96   


 


11/7/18 21:00      Room Air  


 


11/7/18 20:00 98.8 75 19 114/50 (71) 96   


 


11/7/18 16:00 98.8 77 20 136/66 (89) 96   








Height (Feet):  6


Height (Inches):  2.00


Weight (Pounds):  204


HEENT:  anicteric


Respiratory/Chest:  no respiratory distress


Cardiovascular:  regularly irregular


Abdomen:  no organomegaly





Microbiology








 Date/Time


Source Procedure


Growth Status


 


 


 11/6/18 17:25


Blood Blood Culture - Preliminary


NO GROWTH AFTER 24 HOURS Resulted


 


 11/6/18 17:10


Blood Blood Culture - Preliminary


NO GROWTH AFTER 24 HOURS Resulted


 


 11/6/18 17:30


Urine,Clean Catch Urine Culture - Preliminary


Mixed Gram Positive Organism Resulted











Laboratory Tests








Test


  11/8/18


05:50


 


White Blood Count


  7.0 K/UL


(4.8-10.8)


 


Red Blood Count


  3.95 M/UL


(4.70-6.10)  L


 


Hemoglobin


  12.3 G/DL


(14.2-18.0)  L


 


Hematocrit


  35.7 %


(42.0-52.0)  L


 


Mean Corpuscular Volume 90 FL (80-99)  


 


Mean Corpuscular Hemoglobin


  31.1 PG


(27.0-31.0)  H


 


Mean Corpuscular Hemoglobin


Concent 34.4 G/DL


(32.0-36.0)


 


Red Cell Distribution Width


  11.3 %


(11.6-14.8)  L


 


Platelet Count


  125 K/UL


(150-450)  L


 


Mean Platelet Volume


  6.7 FL


(6.5-10.1)


 


Neutrophils (%) (Auto)


  73.2 %


(45.0-75.0)


 


Lymphocytes (%) (Auto)


  12.3 %


(20.0-45.0)  L


 


Monocytes (%) (Auto)


  12.3 %


(1.0-10.0)  H


 


Eosinophils (%) (Auto)


  1.3 %


(0.0-3.0)


 


Basophils (%) (Auto)


  0.9 %


(0.0-2.0)


 


Prothrombin Time


  10.1 SEC


(9.30-11.50)


 


Prothromb Time International


Ratio 1.0 (0.9-1.1)  


 


 


Activated Partial


Thromboplast Time 27 SEC (23-33)


 


 


Sodium Level


  143 MMOL/L


(136-145)


 


Potassium Level


  4.3 MMOL/L


(3.5-5.1)


 


Chloride Level


  108 MMOL/L


()  H


 


Carbon Dioxide Level


  29 MMOL/L


(21-32)


 


Anion Gap


  6 mmol/L


(5-15)


 


Blood Urea Nitrogen


  14 mg/dL


(7-18)


 


Creatinine


  1.1 MG/DL


(0.55-1.30)


 


Estimat Glomerular Filtration


Rate > 60 mL/min


(>60)


 


Glucose Level


  158 MG/DL


()  H


 


Calcium Level


  8.2 MG/DL


(8.5-10.1)  L











Current Medications








 Medications


  (Trade)  Dose


 Ordered  Sig/Erasmo


 Route


 PRN Reason  Start Time


 Stop Time Status Last Admin


Dose Admin


 


 Acetaminophen


  (Tylenol)  650 mg  Q4H  PRN


 ORAL


 fever  11/5/18 13:00


 12/5/18 12:59  11/6/18 13:15


 


 


 Al Hydroxide/Mg


 Hydroxide


  (Mylanta II)  30 ml  Q6H  PRN


 ORAL


 dyspepsia  11/5/18 13:00


 12/5/18 12:59   


 


 


 Amlodipine


 Besylate


  (Norvasc)  5 mg  DAILY


 ORAL


   11/6/18 09:00


 12/6/18 08:59   


 


 


 Dextrose


  (Dextrose 50%)  25 ml  Q30M  PRN


 IV


 Hypoglycemia  11/5/18 13:15


 12/5/18 13:06   


 


 


 Dextrose


  (Dextrose 50%)  50 ml  Q30M  PRN


 IV


 hypoglycemia  11/5/18 13:15


 12/5/18 13:14   


 


 


 Dextrose/Sodium


 Chloride  1,000 ml @ 


 75 mls/hr  [id number]


 IV


   11/5/18 13:04


 12/5/18 13:03  11/8/18 06:10


 


 


 Diphenhydramine


 HCl


  (Benadryl)  25 mg  Q15M  PRN


 IVP


 Itching  11/8/18 11:15


 11/8/18 18:00   


 


 


 Diphenhydramine


 HCl


  (Benadryl)  25 mg  Q6H  PRN


 ORAL


 Itching/Pruritis  11/5/18 13:00


 12/5/18 12:59   


 


 


 Divalproex Sodium


  (Depakote ER)  500 mg  EVERY 12  HOURS


 ORAL


   11/5/18 21:00


 12/5/18 20:59  11/7/18 20:28


 


 


 Finasteride


  (Proscar)  5 mg  DAILY


 ORAL


   11/6/18 09:00


 12/6/18 08:59   


 


 


 Hydralazine HCl


  (Apresoline)  5 mg  Q30M  PRN


 IV


 SBP>160 OR___/DBP>90 OR___  11/8/18 11:15


 11/8/18 18:00   


 


 


 Hydromorphone HCl


  (Dilaudid)  0.5 mg  Q15M  PRN


 IVP


 Severe Pain (Pain Scale 7-10)  11/8/18 11:15


 11/8/18 18:00   


 


 


 Insulin Aspart


  (NovoLOG)    BEFORE MEALS AND  HS


 SUBQ


   11/5/18 16:30


 12/5/18 16:29  11/8/18 06:00


 


 


 Lorazepam


  (Ativan)  1 mg  Q6H  PRN


 ORAL


 For Anxiety  11/8/18 03:45


 11/15/18 03:44   


 


 


 Losartan Potassium


  (Cozaar)  50 mg  DAILY


 ORAL


   11/6/18 09:00


 12/6/18 08:59   


 


 


 Morphine Sulfate


  (Morphine


 Sulfate)  2 mg  Q4H  PRN


 IVP


 severe  Pain (Pain Scale 7-10)  11/5/18 13:00


 11/12/18 12:59   


 


 


 Nitroglycerin


  (Ntg)  0.4 mg  Q5M X 3 DOSES PRN


 SL


 Prn Chest Pain  11/5/18 13:00


 12/5/18 12:59   


 


 


 Ondansetron HCl


  (Zofran)  4 mg  Q6H  PRN


 IVP


 Nausea & Vomiting  11/5/18 13:00


 12/5/18 12:59   


 


 


 Piperacillin Sod/


 Tazobactam Sod


 3.375 gm/Dextrose  110 ml @ 


 27.5 mls/hr  EVERY 8  HOURS


 IVPB


   11/6/18 17:00


 11/11/18 16:59  11/8/18 05:59


 


 


 Polyethylene


 Glycol


  (Miralax)  17 gm  HSPRN  PRN


 ORAL


 Constipation  11/5/18 13:00


 12/5/18 12:59   


 


 


 Quetiapine


 Fumarate


  (SEROquel)  50 mg  BID


 ORAL


   11/5/18 18:00


 12/5/18 17:59  11/7/18 17:17


 


 


 Sitagliptin


 Phosphate


  (Januvia)  100 mg  DAILY


 ORAL


   11/6/18 09:00


 12/6/18 08:59   


 


 


 Tamsulosin HCl


  (Flomax)  0.4 mg  BEDTIME


 ORAL


   11/5/18 21:00


 12/5/18 20:59  11/7/18 20:28


 


 


 Temazepam


  (Restoril)  15 mg  HSPRN  PRN


 ORAL


 Insomnia  11/5/18 13:00


 11/12/18 12:59   


 


 


 Ziprasidone


  (Geodon)  20 mg  TWICE A  DAY


 ORAL


   11/5/18 18:00


 12/5/18 17:59  11/7/18 17:16


 

















Rashid Bangura MD Nov 8, 2018 12:49

## 2018-11-08 NOTE — ANETHESIA PREOPERATIVE EVAL
Anesthesia Pre-op PMH/ROS


General


Date of Evaluation:  Nov 8, 2018


Time of Evaluation:  10:14


Anesthesiologist:  Carolyn Duarte CRNA


ASA Score:  ASA 3


Mallampati Score


Class I : Soft palate, uvula, fauces, pillars visible


Class II: Soft palate, uvula, fauces visible


Class III: Soft palate, base of uvula visible


Class IV: Only hard plate visible


Mallampati Classification:  Class II


Surgeon:  Darcy


Diagnosis:  Acute cholecystitis


Surgical Procedure:  Laparoscopic cholecystectomy


Anesthesia History:  none


Social History:  smoking


Family History:  no anesthesia problems


Allergies:  


Coded Allergies:  


     No Known Allergies (Unverified , 1/27/18)


Medications:  see eMAR


Patient NPO?:  Yes


NPO Date:  Nov 8, 2018


NPO Time:  00:00





Past Medical History


Cardiovascular:  Reports: HTN, CAD; 


   Denies: MI, valve dz, arrhythmia, other


Pulmonary:  Reports: COPD; 


   Denies: asthma, KESHA, other


Gastrointestinal/Genitourinary:  Reports: GERD, other - BPH; 


   Denies: CRI, ESRD


Neurologic/Psychiatric:  Reports: dementia, other - Schizoaffective disorder, 

Parkinsons; 


   Denies: CVA, depression/anxiety, TIA


Endocrine:  Reports: DM; 


   Denies: hypothyroidism, steroids, other


HEENT:  Reports: cataract (L), cataract (R); 


   Denies: glaucoma, [place] (L), [place] (R), other


Hematology/Immune:  Reports: anemia, other - Pancytopenia; 


   Denies: DVT, bleeding disorder


Musculoskeletal/Integumentary:  Denies: OA, RA, DJD, DDD, edema, other


PMH Narrative:


as above


PSxH Narrative:


LEFT arm surgery





Anesthesia Pre-op Phys. Exam


Physician Exam





Last Vital Signs








  Date Time  Temp Pulse Resp B/P (MAP) Pulse Ox O2 Delivery O2 Flow Rate FiO2


 


11/8/18 08:00 97.7 74 19 123/91 (102) 97   


 


11/7/18 21:00      Room Air  


 


11/6/18 13:10       1 








Constitutional:  NAD, other - Alert & oriented x 3


Neurologic:  CN 2-12 intact


Cardiovascular:  RRR


Respiratory:  CTA


Gastrointestinal:  S/NT/ND





Airway Exam


Mallampati Score:  Class II


MO:  full


Neck:  FROM


TMD:  > 3 FB


ROM:  full


Teeth:  missing, intact, broken


Dentures:  no upper, no lower





Anesthesia Pre-op A/P


Labs





Hematology








Test


  11/8/18


05:50


 


White Blood Count


  7.0 K/UL


(4.8-10.8)


 


Red Blood Count


  3.95 M/UL


(4.70-6.10)  L


 


Hemoglobin


  12.3 G/DL


(14.2-18.0)  L


 


Hematocrit


  35.7 %


(42.0-52.0)  L


 


Mean Corpuscular Volume 90 FL (80-99)  


 


Mean Corpuscular Hemoglobin


  31.1 PG


(27.0-31.0)  H


 


Mean Corpuscular Hemoglobin


Concent 34.4 G/DL


(32.0-36.0)


 


Red Cell Distribution Width


  11.3 %


(11.6-14.8)  L


 


Platelet Count


  125 K/UL


(150-450)  L


 


Mean Platelet Volume


  6.7 FL


(6.5-10.1)


 


Neutrophils (%) (Auto)


  73.2 %


(45.0-75.0)


 


Lymphocytes (%) (Auto)


  12.3 %


(20.0-45.0)  L


 


Monocytes (%) (Auto)


  12.3 %


(1.0-10.0)  H


 


Eosinophils (%) (Auto)


  1.3 %


(0.0-3.0)


 


Basophils (%) (Auto)


  0.9 %


(0.0-2.0)








Coagulation








Test


  11/8/18


05:50


 


Prothrombin Time


  10.1 SEC


(9.30-11.50)


 


Prothromb Time International


Ratio 1.0 (0.9-1.1)  


 


 


Activated Partial


Thromboplast Time 27 SEC (23-33)


 








Chemistry








Test


  11/8/18


05:50


 


Sodium Level


  143 MMOL/L


(136-145)


 


Potassium Level


  4.3 MMOL/L


(3.5-5.1)


 


Chloride Level


  108 MMOL/L


()  H


 


Carbon Dioxide Level


  29 MMOL/L


(21-32)


 


Anion Gap


  6 mmol/L


(5-15)


 


Blood Urea Nitrogen


  14 mg/dL


(7-18)


 


Creatinine


  1.1 MG/DL


(0.55-1.30)


 


Estimat Glomerular Filtration


Rate > 60 mL/min


(>60)


 


Glucose Level


  158 MG/DL


()  H


 


Calcium Level


  8.2 MG/DL


(8.5-10.1)  L








Accucheck


142 @ 0630





Studies


Pre-op Studies:  EKG - NS HR 75, CXR - no acute changes





Risk Assessment & Plan


Assessment:


ASA 3, ok to proceed


Plan:


GETA


Status Change Before Surgery:  No





Pre-Antibiotics


Drug:  TBA











Carolyn Duarte CRNA Nov 8, 2018 10:17

## 2018-11-08 NOTE — GENERAL PROGRESS NOTE
Assessment/Plan


Problem List:  


(1) HTN (hypertension)


ICD Codes:  I10 - Essential (primary) hypertension


SNOMED:  20083567


(2) Diabetes


ICD Codes:  E11.9 - Type 2 diabetes mellitus without complications


SNOMED:  45807881


(3) COPD (chronic obstructive pulmonary disease)


ICD Codes:  J44.9 - Chronic obstructive pulmonary disease, unspecified


SNOMED:  85664851


(4) Psychiatric disorder


ICD Codes:  F99 - Mental disorder, not otherwise specified


SNOMED:  56612713, 670983313


(5) Parkinson disease


ICD Codes:  G20 - Parkinson's disease


SNOMED:  42817887


(6) UGI bleed


ICD Codes:  K92.2 - Gastrointestinal hemorrhage, unspecified


SNOMED:  64650831


(7) Weak


ICD Codes:  R53.1 - Weakness


SNOMED:  60468770


(8) Abdominal pain


ICD Codes:  R10.9 - Unspecified abdominal pain


SNOMED:  32640878


Qualifiers:  


   Qualified Codes:  R10.31 - Right lower quadrant pain


Status:  stable, progressing


Assessment/Plan


ot pt diet gi f/u sx f/u cbc bmp am





Subjective


Constitutional:  Reports: weakness


Allergies:  


Coded Allergies:  


     No Known Allergies (Unverified , 1/27/18)


All Systems:  reviewed and negative except above


Subjective


calm in bed awaitng sx





Objective





Last 24 Hour Vital Signs








  Date Time  Temp Pulse Resp B/P (MAP) Pulse Ox O2 Delivery O2 Flow Rate FiO2


 


11/8/18 09:00      Room Air  


 


11/8/18 08:00 97.7 74 19 123/91 (102) 97   


 


11/8/18 04:00 97.7 66 19 123/62 (82) 95   


 


11/8/18 00:00 99.1 68 19 129/62 (84) 96   


 


11/7/18 21:00      Room Air  


 


11/7/18 20:00 98.8 75 19 114/50 (71) 96   


 


11/7/18 16:00 98.8 77 20 136/66 (89) 96   

















Intake and Output  


 


 11/7/18 11/8/18





 19:00 07:00


 


Intake Total 1275 ml 


 


Output Total 700 ml 600 ml


 


Balance 575 ml -600 ml


 


  


 


Intake Oral 600 ml 


 


IV Total 675 ml 


 


Output Urine Total 700 ml 600 ml








Laboratory Tests


11/8/18 05:50: 


White Blood Count 7.0, Red Blood Count 3.95L, Hemoglobin 12.3L, Hematocrit 35.7L

, Mean Corpuscular Volume 90, Mean Corpuscular Hemoglobin 31.1H, Mean 

Corpuscular Hemoglobin Concent 34.4, Red Cell Distribution Width 11.3L, 

Platelet Count 125L, Mean Platelet Volume 6.7, Neutrophils (%) (Auto) 73.2, 

Lymphocytes (%) (Auto) 12.3L, Monocytes (%) (Auto) 12.3H, Eosinophils (%) (Auto

) 1.3, Basophils (%) (Auto) 0.9, Prothrombin Time 10.1, Prothromb Time 

International Ratio 1.0, Activated Partial Thromboplast Time 27, Sodium Level 

143, Potassium Level 4.3, Chloride Level 108H, Carbon Dioxide Level 29, Anion 

Gap 6, Blood Urea Nitrogen 14, Creatinine 1.1, Estimat Glomerular Filtration 

Rate > 60, Glucose Level 158H, Calcium Level 8.2L


Height (Feet):  6


Height (Inches):  2.00


Weight (Pounds):  204


General Appearance:  lethargic


EENT:  normal ENT inspection


Neck:  normal alignment


Cardiovascular:  normal peripheral pulses, normal rate, regular rhythm


Respiratory/Chest:  chest wall non-tender, lungs clear, normal breath sounds


Abdomen:  normal bowel sounds, non tender, soft


Extremities:  normal inspection


Edema:  no edema noted Arm (L), no edema noted Arm (R), no edema noted Leg (L), 

no edema noted Leg (R), no edema noted Pedal (L), no edema noted Pedal (R), no 

edema noted Generalized


Neurologic:  motor weakness


Skin:  normal pigmentation, warm/dry











Jean-Pierre Henderson DO Nov 8, 2018 13:05

## 2018-11-08 NOTE — GI PROGRESS NOTE
Assessment/Plan


Problems:  


(1) Cholecystitis


ICD Codes:  K81.9 - Cholecystitis, unspecified


SNOMED:  00361219


(2) Abdominal pain


ICD Codes:  R10.9 - Unspecified abdominal pain


SNOMED:  69632223


Qualifiers:  


   Qualified Codes:  R10.31 - Right lower quadrant pain


(3) Elevated LFTs


ICD Codes:  R94.5 - Abnormal results of liver function studies


SNOMED:  762212912, 215369178


(4) Fever


ICD Codes:  R50.9 - Fever, unspecified


SNOMED:  267575354


(5) Pancytopenia


ICD Codes:  D61.818 - Other pancytopenia


SNOMED:  717314249


(6) Failure to thrive


SNOMED:  67836236


Status:  unchanged


Status Narrative


Discussed with Dr. Elizondo.


Assessment/Plan


patient for cholecystectomy today


fu surgical recs


pain mgmt


zofran prn


will follow with addition recs post procedure.





The patient was seen and examined at bedside and all new and available data was 

reviewed in the patients chart. I agree with the above findings, impression 

and plan.  (Patient seen earlier today. Signature stamp does not reflect 

patient encounter time.). - Curtis Elizondo MD





Subjective


Gastrointestinal/Abdominal:  Reports: abdominal pain





Objective





Last 24 Hour Vital Signs








  Date Time  Temp Pulse Resp B/P (MAP) Pulse Ox O2 Delivery O2 Flow Rate FiO2


 


11/8/18 08:00 97.7 74 19 123/91 (102) 97   


 


11/8/18 04:00 97.7 66 19 123/62 (82) 95   


 


11/8/18 00:00 99.1 68 19 129/62 (84) 96   


 


11/7/18 21:00      Room Air  


 


11/7/18 20:00 98.8 75 19 114/50 (71) 96   


 


11/7/18 16:00 98.8 77 20 136/66 (89) 96   


 


11/7/18 12:00 97.9 72 20 136/66 (89) 97   

















Intake and Output  


 


 11/7/18 11/8/18





 19:00 07:00


 


Intake Total 1275 ml 


 


Output Total 700 ml 600 ml


 


Balance 575 ml -600 ml


 


  


 


Intake Oral 600 ml 


 


IV Total 675 ml 


 


Output Urine Total 700 ml 600 ml











Laboratory Tests








Test


  11/8/18


05:50


 


White Blood Count


  7.0 K/UL


(4.8-10.8)


 


Red Blood Count


  3.95 M/UL


(4.70-6.10)  L


 


Hemoglobin


  12.3 G/DL


(14.2-18.0)  L


 


Hematocrit


  35.7 %


(42.0-52.0)  L


 


Mean Corpuscular Volume 90 FL (80-99)  


 


Mean Corpuscular Hemoglobin


  31.1 PG


(27.0-31.0)  H


 


Mean Corpuscular Hemoglobin


Concent 34.4 G/DL


(32.0-36.0)


 


Red Cell Distribution Width


  11.3 %


(11.6-14.8)  L


 


Platelet Count


  125 K/UL


(150-450)  L


 


Mean Platelet Volume


  6.7 FL


(6.5-10.1)


 


Neutrophils (%) (Auto)


  73.2 %


(45.0-75.0)


 


Lymphocytes (%) (Auto)


  12.3 %


(20.0-45.0)  L


 


Monocytes (%) (Auto)


  12.3 %


(1.0-10.0)  H


 


Eosinophils (%) (Auto)


  1.3 %


(0.0-3.0)


 


Basophils (%) (Auto)


  0.9 %


(0.0-2.0)


 


Prothrombin Time


  10.1 SEC


(9.30-11.50)


 


Prothromb Time International


Ratio 1.0 (0.9-1.1)  


 


 


Activated Partial


Thromboplast Time 27 SEC (23-33)


 


 


Sodium Level


  143 MMOL/L


(136-145)


 


Potassium Level


  4.3 MMOL/L


(3.5-5.1)


 


Chloride Level


  108 MMOL/L


()  H


 


Carbon Dioxide Level


  29 MMOL/L


(21-32)


 


Anion Gap


  6 mmol/L


(5-15)


 


Blood Urea Nitrogen


  14 mg/dL


(7-18)


 


Creatinine


  1.1 MG/DL


(0.55-1.30)


 


Estimat Glomerular Filtration


Rate > 60 mL/min


(>60)


 


Glucose Level


  158 MG/DL


()  H


 


Calcium Level


  8.2 MG/DL


(8.5-10.1)  L








Height (Feet):  6


Height (Inches):  2.00


Weight (Pounds):  204


General Appearance:  WD/WN, no apparent distress, alert


Cardiovascular:  normal rate


Respiratory/Chest:  normal breath sounds, no respiratory distress


Abdominal Exam:  normal bowel sounds, non tender, soft


Extremities:  normal range of motion, non-tender











Silvino Jones NP Nov 8, 2018 11:09

## 2018-11-08 NOTE — ANETHESIA PREOPERATIVE EVAL
Anesthesia Pre-op PMH/ROS


General


Date of Evaluation:  Nov 8, 2018


Time of Evaluation:  05:56


Anesthesiologist:  Lida


ASA Score:  ASA 3


Mallampati Score


Class I : Soft palate, uvula, fauces, pillars visible


Class II: Soft palate, uvula, fauces visible


Class III: Soft palate, base of uvula visible


Class IV: Only hard plate visible


Mallampati Classification:  Class II


Surgeon:  Bess


Diagnosis:  Abd Pain


Surgical Procedure:  Laparoscopic Cholecystectomy


Anesthesia History:  none


Social History:  smoking


Family History:  no anesthesia problems


Allergies:  


Coded Allergies:  


     No Known Allergies (Unverified , 1/27/18)


Medications:  see eMAR


Patient NPO?:  Yes





Past Medical History


Cardiovascular:  Reports: HTN


Pulmonary:  Reports: COPD


Gastrointestinal/Genitourinary:  Reports: GERD


Neurologic/Psychiatric:  Reports: other - Seizures, Parkinsons


Endocrine:  Reports: DM





Anesthesia Pre-op Phys. Exam


Physician Exam





Last Vital Signs








  Date Time  Temp Pulse Resp B/P (MAP) Pulse Ox O2 Delivery O2 Flow Rate FiO2


 


11/8/18 04:00 97.7 66 19 123/62 (82) 95   


 


11/7/18 21:00      Room Air  


 


11/6/18 13:10       1 








Constitutional:  NAD


Neurologic:  CN 2-12 intact


Cardiovascular:  RRR


Respiratory:  CTA


Gastrointestinal:  S/NT/ND





Airway Exam


Mallampati Score:  Class II


MO:  limited


ROM:  limited


Teeth:  missing, intact





Anesthesia Pre-op A/P


Labs





Hematology








Test


  11/8/18


05:50


 


White Blood Count Pending  


 


Red Blood Count Pending  


 


Hemoglobin Pending  


 


Hematocrit Pending  


 


Mean Corpuscular Volume Pending  


 


Mean Corpuscular Hemoglobin Pending  


 


Mean Corpuscular Hemoglobin


Concent Pending  


 


 


Red Cell Distribution Width Pending  


 


Platelet Count Pending  


 


Mean Platelet Volume Pending  


 


Neutrophils (%) (Auto) Pending  


 


Lymphocytes (%) (Auto) Pending  


 


Monocytes (%) (Auto) Pending  


 


Eosinophils (%) (Auto) Pending  


 


Basophils (%) (Auto) Pending  








Coagulation








Test


  11/8/18


05:50


 


Prothrombin Time Pending  


 


Prothromb Time International


Ratio Pending  


 


 


Activated Partial


Thromboplast Time Pending  


 








Chemistry








Test


  11/8/18


05:50


 


Sodium Level Pending  


 


Potassium Level Pending  


 


Chloride Level Pending  


 


Carbon Dioxide Level Pending  


 


Blood Urea Nitrogen Pending  


 


Creatinine Pending  


 


Estimat Glomerular Filtration


Rate Pending  


 


 


Glucose Level Pending  


 


Calcium Level Pending  











Risk Assessment & Plan


Assessment:


ASA 3


Plan:


GA


Status Change Before Surgery:  No





Pre-Antibiotics


Drug:  Gera Farias MD Nov 8, 2018 06:37

## 2018-11-09 VITALS — DIASTOLIC BLOOD PRESSURE: 73 MMHG | SYSTOLIC BLOOD PRESSURE: 150 MMHG

## 2018-11-09 VITALS — SYSTOLIC BLOOD PRESSURE: 146 MMHG | DIASTOLIC BLOOD PRESSURE: 77 MMHG

## 2018-11-09 VITALS — SYSTOLIC BLOOD PRESSURE: 143 MMHG | DIASTOLIC BLOOD PRESSURE: 69 MMHG

## 2018-11-09 VITALS — SYSTOLIC BLOOD PRESSURE: 134 MMHG | DIASTOLIC BLOOD PRESSURE: 71 MMHG

## 2018-11-09 VITALS — SYSTOLIC BLOOD PRESSURE: 162 MMHG | DIASTOLIC BLOOD PRESSURE: 79 MMHG

## 2018-11-09 VITALS — SYSTOLIC BLOOD PRESSURE: 143 MMHG | DIASTOLIC BLOOD PRESSURE: 72 MMHG

## 2018-11-09 VITALS — SYSTOLIC BLOOD PRESSURE: 126 MMHG | DIASTOLIC BLOOD PRESSURE: 69 MMHG

## 2018-11-09 LAB
ADD MANUAL DIFF: NO
ANION GAP SERPL CALC-SCNC: 11 MMOL/L (ref 5–15)
BASOPHILS NFR BLD AUTO: 0.4 % (ref 0–2)
BUN SERPL-MCNC: 16 MG/DL (ref 7–18)
CALCIUM SERPL-MCNC: 8.5 MG/DL (ref 8.5–10.1)
CHLORIDE SERPL-SCNC: 102 MMOL/L (ref 98–107)
CO2 SERPL-SCNC: 27 MMOL/L (ref 21–32)
CREAT SERPL-MCNC: 1.3 MG/DL (ref 0.55–1.3)
EOSINOPHIL NFR BLD AUTO: 0 % (ref 0–3)
ERYTHROCYTE [DISTWIDTH] IN BLOOD BY AUTOMATED COUNT: 11.5 % (ref 11.6–14.8)
HCT VFR BLD CALC: 41.7 % (ref 42–52)
HGB BLD-MCNC: 14.1 G/DL (ref 14.2–18)
LYMPHOCYTES NFR BLD AUTO: 9.5 % (ref 20–45)
MCV RBC AUTO: 91 FL (ref 80–99)
MONOCYTES NFR BLD AUTO: 10.7 % (ref 1–10)
NEUTROPHILS NFR BLD AUTO: 79.4 % (ref 45–75)
PLATELET # BLD: 169 K/UL (ref 150–450)
POTASSIUM SERPL-SCNC: 3.5 MMOL/L (ref 3.5–5.1)
RBC # BLD AUTO: 4.6 M/UL (ref 4.7–6.1)
SODIUM SERPL-SCNC: 140 MMOL/L (ref 136–145)
WBC # BLD AUTO: 9.3 K/UL (ref 4.8–10.8)

## 2018-11-09 RX ADMIN — ZIPRASIDONE HYDROCHLORIDE SCH MG: 20 CAPSULE ORAL at 09:38

## 2018-11-09 RX ADMIN — DIVALPROEX SODIUM SCH MG: 500 TABLET, FILM COATED, EXTENDED RELEASE ORAL at 09:38

## 2018-11-09 RX ADMIN — MORPHINE SULFATE PRN MG: 4 INJECTION INTRAVENOUS at 22:33

## 2018-11-09 RX ADMIN — DOCUSATE SODIUM SCH MG: 100 CAPSULE, LIQUID FILLED ORAL at 17:26

## 2018-11-09 RX ADMIN — LOSARTAN POTASSIUM SCH MG: 50 TABLET, FILM COATED ORAL at 09:37

## 2018-11-09 RX ADMIN — INSULIN ASPART SCH UNITS: 100 INJECTION, SOLUTION INTRAVENOUS; SUBCUTANEOUS at 11:56

## 2018-11-09 RX ADMIN — MORPHINE SULFATE PRN MG: 4 INJECTION INTRAVENOUS at 00:47

## 2018-11-09 RX ADMIN — DIVALPROEX SODIUM SCH MG: 500 TABLET, FILM COATED, EXTENDED RELEASE ORAL at 20:13

## 2018-11-09 RX ADMIN — INSULIN ASPART SCH UNITS: 100 INJECTION, SOLUTION INTRAVENOUS; SUBCUTANEOUS at 16:33

## 2018-11-09 RX ADMIN — TAMSULOSIN HYDROCHLORIDE SCH MG: 0.4 CAPSULE ORAL at 20:13

## 2018-11-09 RX ADMIN — MORPHINE SULFATE PRN MG: 4 INJECTION INTRAVENOUS at 09:45

## 2018-11-09 RX ADMIN — ZIPRASIDONE HYDROCHLORIDE SCH MG: 20 CAPSULE ORAL at 17:26

## 2018-11-09 RX ADMIN — DEXTROSE MONOHYDRATE SCH MLS/HR: 50 INJECTION, SOLUTION INTRAVENOUS at 05:44

## 2018-11-09 RX ADMIN — INSULIN ASPART SCH UNITS: 100 INJECTION, SOLUTION INTRAVENOUS; SUBCUTANEOUS at 05:53

## 2018-11-09 RX ADMIN — DEXTROSE MONOHYDRATE SCH MLS/HR: 50 INJECTION, SOLUTION INTRAVENOUS at 13:54

## 2018-11-09 RX ADMIN — DOCUSATE SODIUM SCH MG: 100 CAPSULE, LIQUID FILLED ORAL at 09:38

## 2018-11-09 RX ADMIN — INSULIN ASPART SCH UNITS: 100 INJECTION, SOLUTION INTRAVENOUS; SUBCUTANEOUS at 20:14

## 2018-11-09 NOTE — GENERAL PROGRESS NOTE
Assessment/Plan


Problem List:  


(1) HTN (hypertension)


ICD Codes:  I10 - Essential (primary) hypertension


SNOMED:  66066826


(2) Diabetes


ICD Codes:  E11.9 - Type 2 diabetes mellitus without complications


SNOMED:  35347279


(3) COPD (chronic obstructive pulmonary disease)


ICD Codes:  J44.9 - Chronic obstructive pulmonary disease, unspecified


SNOMED:  70476278


(4) Psychiatric disorder


ICD Codes:  F99 - Mental disorder, not otherwise specified


SNOMED:  31887575, 850876153


(5) Parkinson disease


ICD Codes:  G20 - Parkinson's disease


SNOMED:  68113774


(6) UGI bleed


ICD Codes:  K92.2 - Gastrointestinal hemorrhage, unspecified


SNOMED:  35187993


(7) Weak


ICD Codes:  R53.1 - Weakness


SNOMED:  76075651


(8) Abdominal pain


ICD Codes:  R10.9 - Unspecified abdominal pain


SNOMED:  14821640


Qualifiers:  


   Qualified Codes:  R10.31 - Right lower quadrant pain


(9) Post-cholecystectomy syndrome


ICD Codes:  K91.5 - Postcholecystectomy syndrome


SNOMED:  13917343


Status:  stable, progressing


Assessment/Plan


adv diet ot pt diet gi f/u sx f/u cbc bmp am dc plan if clear by sx





Subjective


Constitutional:  Reports: weakness


Allergies:  


Coded Allergies:  


     No Known Allergies (Unverified , 1/27/18)


All Systems:  reviewed and negative except above


Subjective


calm in bed s/p sx  ike clear liquid diet





Objective





Last 24 Hour Vital Signs








  Date Time  Temp Pulse Resp B/P (MAP) Pulse Ox O2 Delivery O2 Flow Rate FiO2


 


11/9/18 10:20  68 22  98   


 


11/9/18 10:15 97.6       


 


11/9/18 09:38  82  150/73    


 


11/9/18 09:37    150/73    


 


11/9/18 09:00      Nasal Cannula 3.0 


 


11/9/18 08:00 99.0 82 20 150/73 (98) 98   


 


11/9/18 06:24 98.6       


 


11/9/18 04:00 100.0 83 19 146/77 (100) 100   


 


11/9/18 01:00    143/72 (95)    


 


11/9/18 00:00 99.6 82 19 162/79 (106) 98   


 


11/8/18 21:00      Nasal Cannula 3.0 


 


11/8/18 20:00 98.1 79 19 151/71 (97) 97   


 


11/8/18 18:43 100.6 85 19 129/80 (96) 99   


 


11/8/18 17:15 100.6 85 19 123/92 (102) 99   


 


11/8/18 16:00 97.8 76 19 156/83 (107) 96   


 


11/8/18 14:10 99.0 80 19 167/82 (110) 100   


 


11/8/18 13:45 98.2 77 20 152/67 98 Nasal Cannula 3 


 


11/8/18 13:30  79 20 155/67 98 Nasal Cannula 3 


 


11/8/18 13:19  83 20 154/54 98 Nasal Cannula 3 


 


11/8/18 13:11  72 20 148/70 98 Simple Mask 8 


 


11/8/18 13:06  75 20 148/72 98 Simple Mask 8 


 


11/8/18 13:01 98.2 75 20 148/73 98 Simple Mask 8 

















Intake and Output  


 


 11/8/18 11/9/18





 19:00 07:00


 


Intake Total 2065 ml 75 ml


 


Output Total  200 ml


 


Balance 2065 ml -125 ml


 


  


 


Intake Oral 240 ml 


 


IV Total 1825 ml 75 ml


 


Output Urine Total  200 ml


 


# Voids 1 1








Laboratory Tests


11/9/18 05:00: 


White Blood Count 9.3, Red Blood Count 4.60L, Hemoglobin 14.1L, Hematocrit 41.7L

, Mean Corpuscular Volume 91, Mean Corpuscular Hemoglobin 30.7, Mean 

Corpuscular Hemoglobin Concent 33.9, Red Cell Distribution Width 11.5L, 

Platelet Count 169, Mean Platelet Volume 5.4L, Neutrophils (%) (Auto) 79.4H, 

Lymphocytes (%) (Auto) 9.5L, Monocytes (%) (Auto) 10.7H, Eosinophils (%) (Auto) 

0.0, Basophils (%) (Auto) 0.4, Sodium Level 140, Potassium Level 3.5, Chloride 

Level 102, Carbon Dioxide Level 27, Anion Gap 11, Blood Urea Nitrogen 16, 

Creatinine 1.3, Estimat Glomerular Filtration Rate 55.2, Glucose Level 167H, 

Calcium Level 8.5


Height (Feet):  6


Height (Inches):  2.00


Weight (Pounds):  204


General Appearance:  lethargic


EENT:  normal ENT inspection


Neck:  normal alignment


Cardiovascular:  normal peripheral pulses, normal rate, regular rhythm


Respiratory/Chest:  chest wall non-tender, lungs clear, normal breath sounds


Abdomen:  non tender, soft, hypoactive bowel sounds


Extremities:  normal inspection


Edema:  no edema noted Arm (L), no edema noted Arm (R), no edema noted Leg (L), 

no edema noted Leg (R), no edema noted Pedal (L), no edema noted Pedal (R), no 

edema noted Generalized


Neurologic:  responsive, motor weakness


Skin:  normal pigmentation, warm/dry











Jean-Pierre Henderson DO Nov 9, 2018 11:55

## 2018-11-09 NOTE — GI PROGRESS NOTE
Assessment/Plan


Problems:  


(1) Cholecystitis


ICD Codes:  K81.9 - Cholecystitis, unspecified


SNOMED:  65560256


(2) Abdominal pain


ICD Codes:  R10.9 - Unspecified abdominal pain


SNOMED:  23616746


Qualifiers:  


   Qualified Codes:  R10.31 - Right lower quadrant pain


(3) Elevated LFTs


ICD Codes:  R94.5 - Abnormal results of liver function studies


SNOMED:  991816957, 706273741


(4) Fever


ICD Codes:  R50.9 - Fever, unspecified


SNOMED:  346602722


(5) Pancytopenia


ICD Codes:  D61.818 - Other pancytopenia


SNOMED:  697909370


(6) Failure to thrive


SNOMED:  79443869


Status:  stable


Status Narrative


Discussed with Dr. Elizondo.


Assessment/Plan


s/p EGD >> gastritis, bx negative for H. Pylori


s/p lap tunde, fu surgical recs


pain mgmt


zofran prn


fu labs





The patient was seen and examined at bedside and all new and available data was 

reviewed in the patients chart. I agree with the above findings, impression 

and plan.  (Patient seen earlier today. Signature stamp does not reflect 

patient encounter time.). - Curtis Elizondo MD





Subjective


Gastrointestinal/Abdominal:  Reports: abdominal pain





Objective





Last 24 Hour Vital Signs








  Date Time  Temp Pulse Resp B/P (MAP) Pulse Ox O2 Delivery O2 Flow Rate FiO2


 


11/9/18 10:20  68 22  98   


 


11/9/18 10:15 97.6       


 


11/9/18 09:38  82  150/73    


 


11/9/18 09:37    150/73    


 


11/9/18 09:00      Nasal Cannula 3.0 


 


11/9/18 08:00 99.0 82 20 150/73 (98) 98   


 


11/9/18 06:24 98.6       


 


11/9/18 04:00 100.0 83 19 146/77 (100) 100   


 


11/9/18 01:00    143/72 (95)    


 


11/9/18 00:00 99.6 82 19 162/79 (106) 98   


 


11/8/18 21:00      Nasal Cannula 3.0 


 


11/8/18 20:00 98.1 79 19 151/71 (97) 97   


 


11/8/18 18:43 100.6 85 19 129/80 (96) 99   


 


11/8/18 17:15 100.6 85 19 123/92 (102) 99   


 


11/8/18 16:00 97.8 76 19 156/83 (107) 96   


 


11/8/18 14:10 99.0 80 19 167/82 (110) 100   


 


11/8/18 13:45 98.2 77 20 152/67 98 Nasal Cannula 3 


 


11/8/18 13:30  79 20 155/67 98 Nasal Cannula 3 


 


11/8/18 13:19  83 20 154/54 98 Nasal Cannula 3 


 


11/8/18 13:11  72 20 148/70 98 Simple Mask 8 


 


11/8/18 13:06  75 20 148/72 98 Simple Mask 8 


 


11/8/18 13:01 98.2 75 20 148/73 98 Simple Mask 8 

















Intake and Output  


 


 11/8/18 11/9/18





 19:00 07:00


 


Intake Total 2065 ml 75 ml


 


Output Total  200 ml


 


Balance 2065 ml -125 ml


 


  


 


Intake Oral 240 ml 


 


IV Total 1825 ml 75 ml


 


Output Urine Total  200 ml


 


# Voids 1 1











Laboratory Tests








Test


  11/9/18


05:00


 


White Blood Count


  9.3 K/UL


(4.8-10.8)


 


Red Blood Count


  4.60 M/UL


(4.70-6.10)  L


 


Hemoglobin


  14.1 G/DL


(14.2-18.0)  L


 


Hematocrit


  41.7 %


(42.0-52.0)  L


 


Mean Corpuscular Volume 91 FL (80-99)  


 


Mean Corpuscular Hemoglobin


  30.7 PG


(27.0-31.0)


 


Mean Corpuscular Hemoglobin


Concent 33.9 G/DL


(32.0-36.0)


 


Red Cell Distribution Width


  11.5 %


(11.6-14.8)  L


 


Platelet Count


  169 K/UL


(150-450)


 


Mean Platelet Volume


  5.4 FL


(6.5-10.1)  L


 


Neutrophils (%) (Auto)


  79.4 %


(45.0-75.0)  H


 


Lymphocytes (%) (Auto)


  9.5 %


(20.0-45.0)  L


 


Monocytes (%) (Auto)


  10.7 %


(1.0-10.0)  H


 


Eosinophils (%) (Auto)


  0.0 %


(0.0-3.0)


 


Basophils (%) (Auto)


  0.4 %


(0.0-2.0)


 


Sodium Level


  140 MMOL/L


(136-145)


 


Potassium Level


  3.5 MMOL/L


(3.5-5.1)


 


Chloride Level


  102 MMOL/L


()


 


Carbon Dioxide Level


  27 MMOL/L


(21-32)


 


Anion Gap


  11 mmol/L


(5-15)


 


Blood Urea Nitrogen


  16 mg/dL


(7-18)


 


Creatinine


  1.3 MG/DL


(0.55-1.30)


 


Estimat Glomerular Filtration


Rate 55.2 mL/min


(>60)


 


Glucose Level


  167 MG/DL


()  H


 


Calcium Level


  8.5 MG/DL


(8.5-10.1)








Height (Feet):  6


Height (Inches):  2.00


Weight (Pounds):  204


General Appearance:  WD/WN, no apparent distress, alert


Cardiovascular:  normal rate


Respiratory/Chest:  normal breath sounds, no respiratory distress


Abdominal Exam:  normal bowel sounds, non tender, soft, other - surgical


Extremities:  normal range of motion, non-tender











Silvino Jones NP Nov 9, 2018 12:13

## 2018-11-09 NOTE — GENERAL PROGRESS NOTE
Progress Note


Progress Note


Surgery:





no acute events.  abdominal pain.  no n/v/f/c.  ambulatory with PT. 


abd soft, non distended, incisional tenderness, wounds c/d/i. 


labs okay





s/p lap tunde for acute cholecystitis


recovering





diet as tolerated


cont IV Abx - gb hydrops with thickened wall and lots of edema 


rx as written


incentive spirometry 


pt/ot











Bandar Kellogg Nov 9, 2018 11:29

## 2018-11-09 NOTE — 48 HOUR POST ANESTHESIA EVAL
Post Anesthesia Evaluation


Procedure:  Laparoscopic cholecystectomy


Date of Evaluation:  Nov 9, 2018


Time of Evaluation:  10:19


Blood Pressure Systolic:  148


0:  74


Pulse Rate:  68


Respiratory Rate:  22


Temperature (Fahrenheit):  97.6


O2 Sat by Pulse Oximetry:  98


Airway:  patent


Nausea:  No


Vomiting:  No


Pain Intensity:  2


Hydration Status:  adequate


Cardiopulmonary Status:


stable


Mental Status/LOC:  patient returned to baseline


Follow-up Care/Observations:


n/a


Post-Anesthesia Complications:


none


Follow-up care needed:  N/A











Luis Covington MD Nov 9, 2018 10:20

## 2018-11-09 NOTE — INFECTIOUS DISEASES PROG NOTE
Assessment/Plan


Assessment/Plan





ASSESSMENT:  The patient is a 68-year-old male with:











Fever, low grade ( post op)


Normal white blood cells.


Acute cholecystitis


   11/8 SP  cholecystectomy ( Hydrops, no perforation)


Transaminitis, improving


   Hepatitis panel:  unremarkable


   HIDA scan Cystic duct: obstruction, suggestive of acute cholecystitis


   Ultrasound of the abdomen : Cholelithiasis 








EGD : Gastritis  11/06/2018.


Diabetes.


Schizoaffective disorder.


COPD.


Parkinson disease.


Failure to thrive.


History of left arm surgery











PLAN:





 DC V Zosyn day # 3  and monitor pt off of AB Rx 


Monitor CBC.


Monitor BMP.


Monitor liver function tests


Monitor cultures (blood, urine)





Subjective


Allergies:  


Coded Allergies:  


     No Known Allergies (Unverified , 1/27/18)


Subjective








Comfortable





Objective


Vital Signs





Last 24 Hour Vital Signs








  Date Time  Temp Pulse Resp B/P (MAP) Pulse Ox O2 Delivery O2 Flow Rate FiO2


 


11/9/18 12:00 99.0 62  134/71 (92)    





  62      


 


11/9/18 10:20  68 22  98   


 


11/9/18 10:15 97.6       


 


11/9/18 09:38  82  150/73    


 


11/9/18 09:37    150/73    


 


11/9/18 09:00      Nasal Cannula 3.0 


 


11/9/18 08:00 99.0 82 20 150/73 (98) 98   


 


11/9/18 06:24 98.6       


 


11/9/18 04:00 100.0 83 19 146/77 (100) 100   


 


11/9/18 01:00    143/72 (95)    


 


11/9/18 00:00 99.6 82 19 162/79 (106) 98   


 


11/8/18 21:00      Nasal Cannula 3.0 


 


11/8/18 20:00 98.1 79 19 151/71 (97) 97   


 


11/8/18 18:43 100.6 85 19 129/80 (96) 99   


 


11/8/18 17:15 100.6 85 19 123/92 (102) 99   


 


11/8/18 16:00 97.8 76 19 156/83 (107) 96   








Height (Feet):  6


Height (Inches):  2.00


Weight (Pounds):  204


HEENT:  anicteric


Respiratory/Chest:  no accessory muscle use


Cardiovascular:  regular rhythm





Microbiology








 Date/Time


Source Procedure


Growth Status


 


 


 11/6/18 17:25


Blood Blood Culture - Preliminary


NO GROWTH AFTER 48 HOURS Resulted


 


 11/6/18 17:10


Blood Blood Culture - Preliminary


NO GROWTH AFTER 48 HOURS Resulted


 


 11/6/18 17:30


Urine,Clean Catch Urine Culture - Final


Mixed Gram Positive Organism Complete











Laboratory Tests








Test


  11/9/18


05:00


 


White Blood Count


  9.3 K/UL


(4.8-10.8)


 


Red Blood Count


  4.60 M/UL


(4.70-6.10)  L


 


Hemoglobin


  14.1 G/DL


(14.2-18.0)  L


 


Hematocrit


  41.7 %


(42.0-52.0)  L


 


Mean Corpuscular Volume 91 FL (80-99)  


 


Mean Corpuscular Hemoglobin


  30.7 PG


(27.0-31.0)


 


Mean Corpuscular Hemoglobin


Concent 33.9 G/DL


(32.0-36.0)


 


Red Cell Distribution Width


  11.5 %


(11.6-14.8)  L


 


Platelet Count


  169 K/UL


(150-450)


 


Mean Platelet Volume


  5.4 FL


(6.5-10.1)  L


 


Neutrophils (%) (Auto)


  79.4 %


(45.0-75.0)  H


 


Lymphocytes (%) (Auto)


  9.5 %


(20.0-45.0)  L


 


Monocytes (%) (Auto)


  10.7 %


(1.0-10.0)  H


 


Eosinophils (%) (Auto)


  0.0 %


(0.0-3.0)


 


Basophils (%) (Auto)


  0.4 %


(0.0-2.0)


 


Sodium Level


  140 MMOL/L


(136-145)


 


Potassium Level


  3.5 MMOL/L


(3.5-5.1)


 


Chloride Level


  102 MMOL/L


()


 


Carbon Dioxide Level


  27 MMOL/L


(21-32)


 


Anion Gap


  11 mmol/L


(5-15)


 


Blood Urea Nitrogen


  16 mg/dL


(7-18)


 


Creatinine


  1.3 MG/DL


(0.55-1.30)


 


Estimat Glomerular Filtration


Rate 55.2 mL/min


(>60)


 


Glucose Level


  167 MG/DL


()  H


 


Calcium Level


  8.5 MG/DL


(8.5-10.1)











Current Medications








 Medications


  (Trade)  Dose


 Ordered  Sig/Erasmo


 Route


 PRN Reason  Start Time


 Stop Time Status Last Admin


Dose Admin


 


 Acetaminophen


  (Tylenol)  650 mg  Q4H  PRN


 ORAL


 fever  11/5/18 13:00


 12/5/18 12:59  11/9/18 05:54


 


 


 Al Hydroxide/Mg


 Hydroxide


  (Mylanta)  15 ml  Q6H  PRN


 ORAL


 DYSPEPSIA  11/8/18 14:00


 12/8/18 13:59   


 


 


 Amlodipine


 Besylate


  (Norvasc)  5 mg  DAILY


 ORAL


   11/6/18 09:00


 12/6/18 08:59  11/9/18 09:38


 


 


 Dextrose


  (Dextrose 50%)  25 ml  Q30M  PRN


 IV


 Hypoglycemia  11/5/18 13:15


 12/5/18 13:06   


 


 


 Dextrose


  (Dextrose 50%)  50 ml  Q30M  PRN


 IV


 hypoglycemia  11/5/18 13:15


 12/5/18 13:14   


 


 


 Diphenhydramine


 HCl


  (Benadryl)  25 mg  Q8H  PRN


 ORAL


 Itching/Pruritis  11/8/18 14:00


 12/8/18 13:59   


 


 


 Divalproex Sodium


  (Depakote ER)  500 mg  EVERY 12  HOURS


 ORAL


   11/5/18 21:00


 12/5/18 20:59  11/9/18 09:38


 


 


 Docusate Sodium


  (Colace)  100 mg  TWICE A  DAY


 ORAL


   11/8/18 18:00


 12/8/18 17:59  11/9/18 09:38


 


 


 Finasteride


  (Proscar)  5 mg  DAILY


 ORAL


   11/6/18 09:00


 12/6/18 08:59  11/9/18 09:38


 


 


 Insulin Aspart


  (NovoLOG)    BEFORE MEALS AND  HS


 SUBQ


   11/5/18 16:30


 12/5/18 16:29  11/9/18 11:56


 


 


 Ketorolac


 Tromethamine


  (Toradol 30mg)  15 mg  Q6H  PRN


 IV


 Breakthrough Pain  11/9/18 08:00


 11/13/18 13:59   


 


 


 Lorazepam


  (Ativan)  1 mg  Q6H  PRN


 ORAL


 For Anxiety  11/8/18 03:45


 11/15/18 03:44   


 


 


 Losartan Potassium


  (Cozaar)  50 mg  DAILY


 ORAL


   11/6/18 09:00


 12/6/18 08:59  11/9/18 09:37


 


 


 Morphine Sulfate


  (Morphine


 Sulfate)  1 mg  Q4H  PRN


 IVP


 pain scale 1-3  11/8/18 14:00


 11/15/18 13:59   


 


 


 Morphine Sulfate


  (Morphine


 Sulfate)  2 mg  Q4H  PRN


 IVP


 Moderate Pain (Pain Scale 4-6)  11/9/18 14:00


 11/16/18 13:59   


 


 


 Morphine Sulfate


  (Morphine


 Sulfate)  4 mg  Q4H  PRN


 IVP


 pain score 7-10  11/8/18 14:00


 11/15/18 13:59  11/9/18 09:45


 


 


 Nitroglycerin


  (Ntg)  0.4 mg  Q5M X 3 DOSES PRN


 SL


 Prn Chest Pain  11/5/18 13:00


 12/5/18 12:59   


 


 


 Ondansetron HCl


  (Zofran)  4 mg  Q6H  PRN


 IVP


 Nausea & Vomiting  11/5/18 13:00


 12/5/18 12:59  11/8/18 18:39


 


 


 Piperacillin Sod/


 Tazobactam Sod


 3.375 gm/Dextrose  110 ml @ 


 27.5 mls/hr  EVERY 8  HOURS


 IVPB


   11/6/18 17:00


 11/11/18 16:59  11/9/18 13:54


 


 


 Polyethylene


 Glycol


  (Miralax)  17 gm  HSPRN  PRN


 ORAL


 Constipation  11/5/18 13:00


 12/5/18 12:59   


 


 


 Quetiapine


 Fumarate


  (SEROquel)  50 mg  BID


 ORAL


   11/5/18 18:00


 12/5/18 17:59  11/9/18 09:38


 


 


 Sitagliptin


 Phosphate


  (Januvia)  100 mg  DAILY


 ORAL


   11/6/18 09:00


 12/6/18 08:59  11/9/18 09:37


 


 


 Tamsulosin HCl


  (Flomax)  0.4 mg  BEDTIME


 ORAL


   11/5/18 21:00


 12/5/18 20:59  11/8/18 20:42


 


 


 Temazepam


  (Restoril)  7.5 mg  HSPRN  PRN


 ORAL


 Insomnia  11/8/18 14:00


 11/15/18 13:59   


 


 


 Ziprasidone


  (Geodon)  20 mg  TWICE A  DAY


 ORAL


   11/5/18 18:00


 12/5/18 17:59  11/9/18 09:38


 

















Rashid Bangura MD Nov 9, 2018 15:55

## 2018-11-09 NOTE — PROGRESS NOTE
DATE:  11/09/2018

NOTE: "POOR AUDIO QUALITY"



SUBJECTIVE:  The patient is a 68-year-old male patient.  He has abnormal

labs and complaints of GI bleeding.  He also has altered mental status and

confusion, diagnosis of schizoaffective bipolar type with increased mood

lability, worsened by the stress of his medical illness.  That is why, his

attending has requested daily psychiatric consultation with diagnosis of

schizoaffective, bipolar type.



MENTAL STATUS EXAMINATION:  This is a 68-year-old male.  Appearance is

disheveled.  Attitude, irritable and agitated.  Affect, guarded and

restricted.  Intellect poor.  Mood, depressed and anxious.  Motor

activity, psychomotor agitation.  Attention span is poor.  Orientation x2.

Speech is pressured.  Thought process, disorganized and illogical.

Thought content, auditory hallucinations and paranoid delusions.  Insight

and judgment is poor.



DIAGNOSIS:  Schizoaffective, bipolar type.



PLAN:  Treat him with Geodon 20 mg twice a day, Seroquel 50 mg twice a day,

and Depakote 500 twice a day to stabilize his mood.  Twenty minutes of

cognitive behavioral therapy to provided to help him identify his

automatic negative thoughts and to help him convert those negative

thoughts to more positive thinking to reduce depression and anxiety.  Seen

and assessed at bedside.  Chart reviewed.  Discussed with staff.  Seen and

assessed in his room.









  ______________________________________________

  Helen Lujan M.D.





DR:  Phyllis

D:  11/09/2018 14:29

T:  11/09/2018 21:43

JOB#:  [id number]

CC:

## 2018-11-10 VITALS — SYSTOLIC BLOOD PRESSURE: 106 MMHG | DIASTOLIC BLOOD PRESSURE: 51 MMHG

## 2018-11-10 VITALS — DIASTOLIC BLOOD PRESSURE: 58 MMHG | SYSTOLIC BLOOD PRESSURE: 125 MMHG

## 2018-11-10 VITALS — SYSTOLIC BLOOD PRESSURE: 125 MMHG | DIASTOLIC BLOOD PRESSURE: 60 MMHG

## 2018-11-10 VITALS — DIASTOLIC BLOOD PRESSURE: 65 MMHG | SYSTOLIC BLOOD PRESSURE: 127 MMHG

## 2018-11-10 VITALS — SYSTOLIC BLOOD PRESSURE: 132 MMHG | DIASTOLIC BLOOD PRESSURE: 62 MMHG

## 2018-11-10 VITALS — DIASTOLIC BLOOD PRESSURE: 64 MMHG | SYSTOLIC BLOOD PRESSURE: 112 MMHG

## 2018-11-10 LAB
ADD MANUAL DIFF: NO
ANION GAP SERPL CALC-SCNC: 7 MMOL/L (ref 5–15)
BASOPHILS NFR BLD AUTO: 0.9 % (ref 0–2)
BUN SERPL-MCNC: 16 MG/DL (ref 7–18)
CALCIUM SERPL-MCNC: 8.8 MG/DL (ref 8.5–10.1)
CHLORIDE SERPL-SCNC: 106 MMOL/L (ref 98–107)
CO2 SERPL-SCNC: 28 MMOL/L (ref 21–32)
CREAT SERPL-MCNC: 1 MG/DL (ref 0.55–1.3)
EOSINOPHIL NFR BLD AUTO: 0.6 % (ref 0–3)
ERYTHROCYTE [DISTWIDTH] IN BLOOD BY AUTOMATED COUNT: 10.9 % (ref 11.6–14.8)
HCT VFR BLD CALC: 34.8 % (ref 42–52)
HGB BLD-MCNC: 12.2 G/DL (ref 14.2–18)
LYMPHOCYTES NFR BLD AUTO: 7.5 % (ref 20–45)
MCV RBC AUTO: 90 FL (ref 80–99)
MONOCYTES NFR BLD AUTO: 14.1 % (ref 1–10)
NEUTROPHILS NFR BLD AUTO: 76.8 % (ref 45–75)
PLATELET # BLD: 160 K/UL (ref 150–450)
POTASSIUM SERPL-SCNC: 4.1 MMOL/L (ref 3.5–5.1)
RBC # BLD AUTO: 3.88 M/UL (ref 4.7–6.1)
SODIUM SERPL-SCNC: 141 MMOL/L (ref 136–145)
WBC # BLD AUTO: 8.3 K/UL (ref 4.8–10.8)

## 2018-11-10 RX ADMIN — MORPHINE SULFATE PRN MG: 2 INJECTION, SOLUTION INTRAMUSCULAR; INTRAVENOUS at 13:46

## 2018-11-10 RX ADMIN — DOCUSATE SODIUM SCH MG: 100 CAPSULE, LIQUID FILLED ORAL at 17:31

## 2018-11-10 RX ADMIN — INSULIN ASPART SCH UNITS: 100 INJECTION, SOLUTION INTRAVENOUS; SUBCUTANEOUS at 20:27

## 2018-11-10 RX ADMIN — INSULIN ASPART SCH UNITS: 100 INJECTION, SOLUTION INTRAVENOUS; SUBCUTANEOUS at 06:05

## 2018-11-10 RX ADMIN — MORPHINE SULFATE PRN MG: 2 INJECTION, SOLUTION INTRAMUSCULAR; INTRAVENOUS at 08:28

## 2018-11-10 RX ADMIN — ZIPRASIDONE HYDROCHLORIDE SCH MG: 20 CAPSULE ORAL at 08:27

## 2018-11-10 RX ADMIN — TAMSULOSIN HYDROCHLORIDE SCH MG: 0.4 CAPSULE ORAL at 20:26

## 2018-11-10 RX ADMIN — LOSARTAN POTASSIUM SCH MG: 50 TABLET, FILM COATED ORAL at 08:26

## 2018-11-10 RX ADMIN — MORPHINE SULFATE PRN MG: 2 INJECTION, SOLUTION INTRAMUSCULAR; INTRAVENOUS at 03:18

## 2018-11-10 RX ADMIN — ZIPRASIDONE HYDROCHLORIDE SCH MG: 20 CAPSULE ORAL at 17:32

## 2018-11-10 RX ADMIN — DIVALPROEX SODIUM SCH MG: 500 TABLET, FILM COATED, EXTENDED RELEASE ORAL at 20:26

## 2018-11-10 RX ADMIN — DIVALPROEX SODIUM SCH MG: 500 TABLET, FILM COATED, EXTENDED RELEASE ORAL at 08:26

## 2018-11-10 RX ADMIN — INSULIN ASPART SCH UNITS: 100 INJECTION, SOLUTION INTRAVENOUS; SUBCUTANEOUS at 16:56

## 2018-11-10 RX ADMIN — INSULIN ASPART SCH UNITS: 100 INJECTION, SOLUTION INTRAVENOUS; SUBCUTANEOUS at 11:44

## 2018-11-10 RX ADMIN — DOCUSATE SODIUM SCH MG: 100 CAPSULE, LIQUID FILLED ORAL at 08:27

## 2018-11-10 NOTE — PULMONOLOGY PROGRESS NOTE
Assessment/Plan


Assessment/Plan


ASSESSMENT


Upper GI bleeding 


status post EGD with biopsy 


chronic gastritis 


acute cholecystitis 


status post laparoscopic cholecystectomy with umbilical hernia repair 


COPD 


hypertension 


diabetes 


schizoaffective disorder  bipolar type





PLAN of CARE 


[facility] floor 


IVF  HIDA was + acute tunde , s/p lap tunde


surgery follows 


tolerates diet 


IV abx  


pain management 


bowel regimen , had BM 


PT/OT 


IS while in the bed  


O2 HHN prn 


BP -->  ARB 


BS--> Januvia +  SSI  prn


LFT trending down : AST WNL, ALT  with trend  down 


GI follows 


s/p EGD with biopsy , which revealed mild to moderate chronic gastritis , no 

evidence of H. pylori 


GI prophylaxis  


monitor HH with goal to keep Hgb above 7- latest 12.2/ 24.8 


psych follows, fup with psych  recs


dc planning   








case discussed and evaluated by supervising physician





Subjective


Allergies:  


Coded Allergies:  


     No Known Allergies (Unverified , 1/27/18)


Subjective


pain controlled 


tolerated diet 


no SOB, no CP  


small BM yesterday





Objective





Last 24 Hour Vital Signs








  Date Time  Temp Pulse Resp B/P (MAP) Pulse Ox O2 Delivery O2 Flow Rate FiO2


 


11/10/18 08:00 98.3 88 19 132/62 (85) 97   


 


11/10/18 04:00 99.2 85 18 125/58 (80) 97   


 


11/10/18 03:48 98.9       


 


11/10/18 00:39 98.9       


 


11/10/18 00:10 98.9 86 19 125/60 (81) 94   


 


11/9/18 23:03 97.5       


 


11/9/18 20:58      Nasal Cannula 3.0 


 


11/9/18 20:48 97.5 83 19 126/69 (88) 95   


 


11/9/18 16:00 99.0 83 24 143/69 (93) 97   


 


11/9/18 12:00 99.0 62  134/71 (92)    





  62      


 


11/9/18 10:20  68 22  98   


 


11/9/18 09:38  82  150/73    


 


11/9/18 09:37    150/73    


 


11/9/18 09:00      Nasal Cannula 3.0 

















Intake and Output  


 


 11/9/18 11/10/18





 19:00 07:00


 


Intake Total 860.0 ml 


 


Output Total 300 ml 400 ml


 


Balance 560.0 ml -400 ml


 


  


 


Intake Oral 450 ml 


 


IV Total 410.0 ml 


 


Output Urine Total 300 ml 400 ml








General Appearance:  no acute distress, other - awake, alert, resposnive


HEENT:  normocephalic, atraumatic, anicteric


Respiratory/Chest:  no respiratory distress, no accessory muscle use


Cardiovascular:  normal rate, no JVD


Abdomen:  normal bowel sounds, soft, non tender - few lap incisiions clean


Extremities:  no edema, pedal pulses normal


Neurologic/Psychiatric:  CNs II-XII grossly normal, no motor/sensory deficits, 

alert, oriented x 3, responsive, normal mood/affect


Musculoskeletal:  normal muscle bulk


Laboratory Tests


11/10/18 06:40: 


White Blood Count 8.3, Red Blood Count 3.88L, Hemoglobin 12.2L, Hematocrit 34.8L

, Mean Corpuscular Volume 90, Mean Corpuscular Hemoglobin 31.3H, Mean 

Corpuscular Hemoglobin Concent 35.0, Red Cell Distribution Width 10.9L, 

Platelet Count 160, Mean Platelet Volume 5.3L, Neutrophils (%) (Auto) 76.8H, 

Lymphocytes (%) (Auto) 7.5L, Monocytes (%) (Auto) 14.1H, Eosinophils (%) (Auto) 

0.6, Basophils (%) (Auto) 0.9, Sodium Level 141, Potassium Level 4.1, Chloride 

Level 106, Carbon Dioxide Level 28, Anion Gap 7, Blood Urea Nitrogen 16, 

Creatinine 1.0, Estimat Glomerular Filtration Rate > 60, Glucose Level 159H, 

Calcium Level 8.8





Current Medications








 Medications


  (Trade)  Dose


 Ordered  Sig/Erasmo


 Route


 PRN Reason  Start Time


 Stop Time Status Last Admin


Dose Admin


 


 Acetaminophen


  (Tylenol)  650 mg  Q4H  PRN


 ORAL


 fever  11/5/18 13:00


 12/5/18 12:59  11/10/18 00:09


 


 


 Al Hydroxide/Mg


 Hydroxide


  (Mylanta)  15 ml  Q6H  PRN


 ORAL


 DYSPEPSIA  11/8/18 14:00


 12/8/18 13:59   


 


 


 Amlodipine


 Besylate


  (Norvasc)  5 mg  DAILY


 ORAL


   11/6/18 09:00


 12/6/18 08:59  11/9/18 09:38


 


 


 Dextrose


  (Dextrose 50%)  25 ml  Q30M  PRN


 IV


 Hypoglycemia  11/5/18 13:15


 12/5/18 13:06   


 


 


 Dextrose


  (Dextrose 50%)  50 ml  Q30M  PRN


 IV


 hypoglycemia  11/5/18 13:15


 12/5/18 13:14   


 


 


 Diphenhydramine


 HCl


  (Benadryl)  25 mg  Q8H  PRN


 ORAL


 Itching/Pruritis  11/8/18 14:00


 12/8/18 13:59   


 


 


 Divalproex Sodium


  (Depakote ER)  500 mg  EVERY 12  HOURS


 ORAL


   11/5/18 21:00


 12/5/18 20:59  11/9/18 20:13


 


 


 Docusate Sodium


  (Colace)  100 mg  TWICE A  DAY


 ORAL


   11/8/18 18:00


 12/8/18 17:59  11/9/18 17:26


 


 


 Finasteride


  (Proscar)  5 mg  DAILY


 ORAL


   11/6/18 09:00


 12/6/18 08:59  11/9/18 09:38


 


 


 Insulin Aspart


  (NovoLOG)    BEFORE MEALS AND  HS


 SUBQ


   11/5/18 16:30


 12/5/18 16:29  11/10/18 06:05


 


 


 Ketorolac


 Tromethamine


  (Toradol 30mg)  15 mg  Q6H  PRN


 IV


 Breakthrough Pain  11/9/18 08:00


 11/13/18 13:59   


 


 


 Lorazepam


  (Ativan)  1 mg  Q6H  PRN


 ORAL


 For Anxiety  11/8/18 03:45


 11/15/18 03:44   


 


 


 Losartan Potassium


  (Cozaar)  50 mg  DAILY


 ORAL


   11/6/18 09:00


 12/6/18 08:59  11/9/18 09:37


 


 


 Morphine Sulfate


  (Morphine


 Sulfate)  1 mg  Q4H  PRN


 IVP


 pain scale 1-3  11/8/18 14:00


 11/15/18 13:59   


 


 


 Morphine Sulfate


  (Morphine


 Sulfate)  2 mg  Q4H  PRN


 IVP


 Moderate Pain (Pain Scale 4-6)  11/9/18 14:00


 11/16/18 13:59  11/10/18 03:18


 


 


 Morphine Sulfate


  (Morphine


 Sulfate)  4 mg  Q4H  PRN


 IVP


 pain score 7-10  11/8/18 14:00


 11/15/18 13:59  11/9/18 22:33


 


 


 Nitroglycerin


  (Ntg)  0.4 mg  Q5M X 3 DOSES PRN


 SL


 Prn Chest Pain  11/5/18 13:00


 12/5/18 12:59   


 


 


 Ondansetron HCl


  (Zofran)  4 mg  Q6H  PRN


 IVP


 Nausea & Vomiting  11/5/18 13:00


 12/5/18 12:59  11/8/18 18:39


 


 


 Polyethylene


 Glycol


  (Miralax)  17 gm  HSPRN  PRN


 ORAL


 Constipation  11/5/18 13:00


 12/5/18 12:59   


 


 


 Quetiapine


 Fumarate


  (SEROquel)  50 mg  BID


 ORAL


   11/5/18 18:00


 12/5/18 17:59  11/9/18 17:26


 


 


 Sitagliptin


 Phosphate


  (Januvia)  100 mg  DAILY


 ORAL


   11/6/18 09:00


 12/6/18 08:59  11/9/18 09:37


 


 


 Tamsulosin HCl


  (Flomax)  0.4 mg  BEDTIME


 ORAL


   11/5/18 21:00


 12/5/18 20:59  11/9/18 20:13


 


 


 Temazepam


  (Restoril)  7.5 mg  HSPRN  PRN


 ORAL


 Insomnia  11/8/18 14:00


 11/15/18 13:59   


 


 


 Ziprasidone


  (Geodon)  20 mg  TWICE A  DAY


 ORAL


   11/5/18 18:00


 12/5/18 17:59  11/9/18 17:26


 

















Shima De La Cruz NP Nov 10, 2018 08:07

## 2018-11-10 NOTE — GENERAL PROGRESS NOTE
Progress Note


Progress Note


Surgery:





pain improved. some abdominal cramping.  no n/v/f/c.  +flatus


wounds c/d/i


ambulatory


labs okay





-diet as tolerated


-abx


d/c planning











Bandar Kellogg Nov 10, 2018 13:51

## 2018-11-10 NOTE — GENERAL PROGRESS NOTE
Assessment/Plan


Problem List:  


(1) Cholecystitis


ICD Codes:  K81.9 - Cholecystitis, unspecified


SNOMED:  85981679


(2) UGI bleed


ICD Codes:  K92.2 - Gastrointestinal hemorrhage, unspecified


SNOMED:  70321900


(3) Abdominal pain


ICD Codes:  R10.9 - Unspecified abdominal pain


SNOMED:  71323729


Qualifiers:  


   Qualified Codes:  R10.31 - Right lower quadrant pain


(4) HTN (hypertension)


ICD Codes:  I10 - Essential (primary) hypertension


SNOMED:  03362867


(5) Diabetes


ICD Codes:  E11.9 - Type 2 diabetes mellitus without complications


SNOMED:  79161826


(6) Psychiatric disorder


ICD Codes:  F99 - Mental disorder, not otherwise specified


SNOMED:  13881775, [id number]


Assessment/Plan


s/p EGD >> gastritis, bx negative for H. Pylori


s/p lap tunde, fu surgical recs


pain mgmt


zofran prn


fu labs





Subjective


ROS Limited/Unobtainable:  Yes


Allergies:  


Coded Allergies:  


     No Known Allergies (Unverified , 1/27/18)





Objective





Last 24 Hour Vital Signs








  Date Time  Temp Pulse Resp B/P (MAP) Pulse Ox O2 Delivery O2 Flow Rate FiO2


 


11/10/18 08:58 98.3       


 


11/10/18 08:27  88  132/62    


 


11/10/18 08:26    132/62    


 


11/10/18 08:00 98.3 88 19 132/62 (85) 97   


 


11/10/18 04:00 99.2 85 18 125/58 (80) 97   


 


11/10/18 00:39 98.9       


 


11/10/18 00:10 98.9 86 19 125/60 (81) 94   


 


11/9/18 23:03 97.5       


 


11/9/18 20:58      Nasal Cannula 3.0 


 


11/9/18 20:48 97.5 83 19 126/69 (88) 95   


 


11/9/18 16:00 99.0 83 24 143/69 (93) 97   


 


11/9/18 12:00 99.0 62  134/71 (92)    





  62      


 


11/9/18 10:20  68 22  98   

















Intake and Output  


 


 11/9/18 11/10/18





 19:00 07:00


 


Intake Total 860.0 ml 


 


Output Total 300 ml 400 ml


 


Balance 560.0 ml -400 ml


 


  


 


Intake Oral 450 ml 


 


IV Total 410.0 ml 


 


Output Urine Total 300 ml 400 ml








Laboratory Tests


11/10/18 06:40: 


White Blood Count 8.3, Red Blood Count 3.88L, Hemoglobin 12.2L, Hematocrit 34.8L

, Mean Corpuscular Volume 90, Mean Corpuscular Hemoglobin 31.3H, Mean 

Corpuscular Hemoglobin Concent 35.0, Red Cell Distribution Width 10.9L, 

Platelet Count 160, Mean Platelet Volume 5.3L, Neutrophils (%) (Auto) 76.8H, 

Lymphocytes (%) (Auto) 7.5L, Monocytes (%) (Auto) 14.1H, Eosinophils (%) (Auto) 

0.6, Basophils (%) (Auto) 0.9, Sodium Level 141, Potassium Level 4.1, Chloride 

Level 106, Carbon Dioxide Level 28, Anion Gap 7, Blood Urea Nitrogen 16, 

Creatinine 1.0, Estimat Glomerular Filtration Rate > 60, Glucose Level 159H, 

Calcium Level 8.8


Height (Feet):  6


Height (Inches):  2.00


Weight (Pounds):  204


EENT:  normal ENT inspection


Neck:  non-tender, normal alignment


Cardiovascular:  normal rate


Respiratory/Chest:  lungs clear


Abdomen:  soft, hypoactive bowel sounds


Extremities:  non-tender











Curtis Elizondo MD Nov 10, 2018 10:02

## 2018-11-10 NOTE — GENERAL PROGRESS NOTE
Assessment/Plan


Problem List:  


(1) HTN (hypertension)


ICD Codes:  I10 - Essential (primary) hypertension


SNOMED:  61436389


(2) Diabetes


ICD Codes:  E11.9 - Type 2 diabetes mellitus without complications


SNOMED:  52180291


(3) COPD (chronic obstructive pulmonary disease)


ICD Codes:  J44.9 - Chronic obstructive pulmonary disease, unspecified


SNOMED:  75926392


(4) Psychiatric disorder


ICD Codes:  F99 - Mental disorder, not otherwise specified


SNOMED:  15056855, 048749944


(5) Parkinson disease


ICD Codes:  G20 - Parkinson's disease


SNOMED:  97072657


(6) UGI bleed


ICD Codes:  K92.2 - Gastrointestinal hemorrhage, unspecified


SNOMED:  97996319


(7) Weak


ICD Codes:  R53.1 - Weakness


SNOMED:  98346217


(8) Abdominal pain


ICD Codes:  R10.9 - Unspecified abdominal pain


SNOMED:  98580748


Qualifiers:  


   Qualified Codes:  R10.31 - Right lower quadrant pain


(9) Post-cholecystectomy syndrome


ICD Codes:  K91.5 - Postcholecystectomy syndrome


SNOMED:  34933458


Status:  stable, progressing


Assessment/Plan


adv diet ot pt diet gi f/u sx f/u cbc bmp am dc plan if clear by sx





Subjective


Constitutional:  Reports: weakness


Allergies:  


Coded Allergies:  


     No Known Allergies (Unverified , 1/27/18)


All Systems:  reviewed and negative except above


Subjective


o2nc sleeping





Objective





Last 24 Hour Vital Signs








  Date Time  Temp Pulse Resp B/P (MAP) Pulse Ox O2 Delivery O2 Flow Rate FiO2


 


11/10/18 08:27  88  132/62    


 


11/10/18 08:26    132/62    


 


11/10/18 08:00 98.3 88 19 132/62 (85) 97   


 


11/10/18 04:00 99.2 85 18 125/58 (80) 97   


 


11/10/18 03:48 98.9       


 


11/10/18 00:39 98.9       


 


11/10/18 00:10 98.9 86 19 125/60 (81) 94   


 


11/9/18 23:03 97.5       


 


11/9/18 20:58      Nasal Cannula 3.0 


 


11/9/18 20:48 97.5 83 19 126/69 (88) 95   


 


11/9/18 16:00 99.0 83 24 143/69 (93) 97   


 


11/9/18 12:00 99.0 62  134/71 (92)    





  62      


 


11/9/18 10:20  68 22  98   


 


11/9/18 09:38  82  150/73    


 


11/9/18 09:37    150/73    


 


11/9/18 09:00      Nasal Cannula 3.0 

















Intake and Output  


 


 11/9/18 11/10/18





 19:00 07:00


 


Intake Total 860.0 ml 


 


Output Total 300 ml 400 ml


 


Balance 560.0 ml -400 ml


 


  


 


Intake Oral 450 ml 


 


IV Total 410.0 ml 


 


Output Urine Total 300 ml 400 ml








Laboratory Tests


11/10/18 06:40: 


White Blood Count 8.3, Red Blood Count 3.88L, Hemoglobin 12.2L, Hematocrit 34.8L

, Mean Corpuscular Volume 90, Mean Corpuscular Hemoglobin 31.3H, Mean 

Corpuscular Hemoglobin Concent 35.0, Red Cell Distribution Width 10.9L, 

Platelet Count 160, Mean Platelet Volume 5.3L, Neutrophils (%) (Auto) 76.8H, 

Lymphocytes (%) (Auto) 7.5L, Monocytes (%) (Auto) 14.1H, Eosinophils (%) (Auto) 

0.6, Basophils (%) (Auto) 0.9, Sodium Level 141, Potassium Level 4.1, Chloride 

Level 106, Carbon Dioxide Level 28, Anion Gap 7, Blood Urea Nitrogen 16, 

Creatinine 1.0, Estimat Glomerular Filtration Rate > 60, Glucose Level 159H, 

Calcium Level 8.8


Height (Feet):  6


Height (Inches):  2.00


Weight (Pounds):  204


General Appearance:  lethargic


EENT:  normal ENT inspection


Neck:  normal alignment


Cardiovascular:  normal peripheral pulses, normal rate, regular rhythm


Respiratory/Chest:  chest wall non-tender, lungs clear, normal breath sounds


Abdomen:  normal bowel sounds, non tender, soft


Extremities:  normal inspection


Edema:  no edema noted Arm (L), no edema noted Arm (R), no edema noted Leg (L), 

no edema noted Leg (R), no edema noted Pedal (L), no edema noted Pedal (R), no 

edema noted Generalized


Neurologic:  motor weakness


Skin:  normal pigmentation, warm/dry











Jean-Pierre Henderson DO Nov 10, 2018 08:30

## 2018-11-10 NOTE — INFECTIOUS DISEASES PROG NOTE
Assessment/Plan


Assessment/Plan





ASSESSMENT:  The patient is a 68-year-old male with:











Fever, low grade ( post op), improving


   Bcx NTD


   CXR no acute findings


Normal white blood cells.


Acute cholecystitis


   11/8 SP  cholecystectomy ( Hydrops, no perforation)


Transaminitis, improving


   Hepatitis panel:  unremarkable


   HIDA scan Cystic duct: obstruction, suggestive of acute cholecystitis


   Ultrasound of the abdomen : Cholelithiasis 








EGD : Gastritis  11/06/2018.


Diabetes.


Schizoaffective disorder.


COPD.


Parkinson disease.


Failure to thrive.


History of left arm surgery











PLAN:





Monitor pt off of AB Rx 


   -11/9 SP Zosyn day # 3


Monitor CBC.


Monitor BMP.


Monitor liver function tests; CMP am


Monitor cultures (blood)





Subjective


Allergies:  


Coded Allergies:  


     No Known Allergies (Unverified , 1/27/18)


Subjective


afebrile in >24hrs


no leukocytosis


off abx


Bcx NTD





Objective


Vital Signs





Last 24 Hour Vital Signs








  Date Time  Temp Pulse Resp B/P (MAP) Pulse Ox O2 Delivery O2 Flow Rate FiO2


 


11/10/18 09:00      Nasal Cannula 3.0 


 


11/10/18 08:58 98.3       


 


11/10/18 08:27  88  132/62    


 


11/10/18 08:26    132/62    


 


11/10/18 08:00 98.3 88 19 132/62 (85) 97   


 


11/10/18 04:00 99.2 85 18 125/58 (80) 97   


 


11/10/18 00:39 98.9       


 


11/10/18 00:10 98.9 86 19 125/60 (81) 94   


 


11/9/18 23:03 97.5       


 


11/9/18 20:58      Nasal Cannula 3.0 


 


11/9/18 20:48 97.5 83 19 126/69 (88) 95   


 


11/9/18 16:00 99.0 83 24 143/69 (93) 97   


 


11/9/18 12:00 99.0 62  134/71 (92)    





  62      








Height (Feet):  6


Height (Inches):  2.00


Weight (Pounds):  204


Objective





General Appearance:  no acute distress, other - awake, alert, resposnive


HEENT:  normocephalic, atraumatic, anicteric


Respiratory/Chest:  no respiratory distress, no accessory muscle use


Cardiovascular:  normal rate, no JVD


Abdomen:  normal bowel sounds, soft, non tender - few lap incisiions clean


Extremities:  no edema, pedal pulses normal


Neurologic/Psychiatric:  CNs II-XII grossly normal, no motor/sensory deficits, 

alert, oriented x 3, responsive, normal mood/affect


Musculoskeletal:  normal muscle bulk





Laboratory Tests








Test


  11/10/18


06:40


 


White Blood Count


  8.3 K/UL


(4.8-10.8)


 


Red Blood Count


  3.88 M/UL


(4.70-6.10)  L


 


Hemoglobin


  12.2 G/DL


(14.2-18.0)  L


 


Hematocrit


  34.8 %


(42.0-52.0)  L


 


Mean Corpuscular Volume 90 FL (80-99)  


 


Mean Corpuscular Hemoglobin


  31.3 PG


(27.0-31.0)  H


 


Mean Corpuscular Hemoglobin


Concent 35.0 G/DL


(32.0-36.0)


 


Red Cell Distribution Width


  10.9 %


(11.6-14.8)  L


 


Platelet Count


  160 K/UL


(150-450)


 


Mean Platelet Volume


  5.3 FL


(6.5-10.1)  L


 


Neutrophils (%) (Auto)


  76.8 %


(45.0-75.0)  H


 


Lymphocytes (%) (Auto)


  7.5 %


(20.0-45.0)  L


 


Monocytes (%) (Auto)


  14.1 %


(1.0-10.0)  H


 


Eosinophils (%) (Auto)


  0.6 %


(0.0-3.0)


 


Basophils (%) (Auto)


  0.9 %


(0.0-2.0)


 


Sodium Level


  141 MMOL/L


(136-145)


 


Potassium Level


  4.1 MMOL/L


(3.5-5.1)


 


Chloride Level


  106 MMOL/L


()


 


Carbon Dioxide Level


  28 MMOL/L


(21-32)


 


Anion Gap


  7 mmol/L


(5-15)


 


Blood Urea Nitrogen


  16 mg/dL


(7-18)


 


Creatinine


  1.0 MG/DL


(0.55-1.30)


 


Estimat Glomerular Filtration


Rate > 60 mL/min


(>60)


 


Glucose Level


  159 MG/DL


()  H


 


Calcium Level


  8.8 MG/DL


(8.5-10.1)











Current Medications








 Medications


  (Trade)  Dose


 Ordered  Sig/Erasmo


 Route


 PRN Reason  Start Time


 Stop Time Status Last Admin


Dose Admin


 


 Acetaminophen


  (Tylenol)  650 mg  Q4H  PRN


 ORAL


 fever  11/5/18 13:00


 12/5/18 12:59  11/10/18 00:09


 


 


 Al Hydroxide/Mg


 Hydroxide


  (Mylanta)  15 ml  Q6H  PRN


 ORAL


 DYSPEPSIA  11/8/18 14:00


 12/8/18 13:59   


 


 


 Amlodipine


 Besylate


  (Norvasc)  5 mg  DAILY


 ORAL


   11/6/18 09:00


 12/6/18 08:59  11/10/18 08:27


 


 


 Dextrose


  (Dextrose 50%)  25 ml  Q30M  PRN


 IV


 Hypoglycemia  11/5/18 13:15


 12/5/18 13:06   


 


 


 Dextrose


  (Dextrose 50%)  50 ml  Q30M  PRN


 IV


 hypoglycemia  11/5/18 13:15


 12/5/18 13:14   


 


 


 Diphenhydramine


 HCl


  (Benadryl)  25 mg  Q8H  PRN


 ORAL


 Itching/Pruritis  11/8/18 14:00


 12/8/18 13:59   


 


 


 Divalproex Sodium


  (Depakote ER)  500 mg  EVERY 12  HOURS


 ORAL


   11/5/18 21:00


 12/5/18 20:59  11/10/18 08:26


 


 


 Docusate Sodium


  (Colace)  100 mg  TWICE A  DAY


 ORAL


   11/8/18 18:00


 12/8/18 17:59  11/10/18 08:27


 


 


 Finasteride


  (Proscar)  5 mg  DAILY


 ORAL


   11/6/18 09:00


 12/6/18 08:59  11/10/18 08:27


 


 


 Insulin Aspart


  (NovoLOG)    BEFORE MEALS AND  HS


 SUBQ


   11/5/18 16:30


 12/5/18 16:29  11/10/18 06:05


 


 


 Ketorolac


 Tromethamine


  (Toradol 30mg)  15 mg  Q6H  PRN


 IV


 Breakthrough Pain  11/9/18 08:00


 11/13/18 13:59   


 


 


 Lorazepam


  (Ativan)  1 mg  Q6H  PRN


 ORAL


 For Anxiety  11/8/18 03:45


 11/15/18 03:44   


 


 


 Losartan Potassium


  (Cozaar)  50 mg  DAILY


 ORAL


   11/6/18 09:00


 12/6/18 08:59  11/10/18 08:26


 


 


 Morphine Sulfate


  (Morphine


 Sulfate)  1 mg  Q4H  PRN


 IVP


 pain scale 1-3  11/8/18 14:00


 11/15/18 13:59   


 


 


 Morphine Sulfate


  (Morphine


 Sulfate)  2 mg  Q4H  PRN


 IVP


 Moderate Pain (Pain Scale 4-6)  11/9/18 14:00


 11/16/18 13:59  11/10/18 08:28


 


 


 Morphine Sulfate


  (Morphine


 Sulfate)  4 mg  Q4H  PRN


 IVP


 pain score 7-10  11/8/18 14:00


 11/15/18 13:59  11/9/18 22:33


 


 


 Nitroglycerin


  (Ntg)  0.4 mg  Q5M X 3 DOSES PRN


 SL


 Prn Chest Pain  11/5/18 13:00


 12/5/18 12:59   


 


 


 Ondansetron HCl


  (Zofran)  4 mg  Q6H  PRN


 IVP


 Nausea & Vomiting  11/5/18 13:00


 12/5/18 12:59  11/8/18 18:39


 


 


 Polyethylene


 Glycol


  (Miralax)  17 gm  HSPRN  PRN


 ORAL


 Constipation  11/5/18 13:00


 12/5/18 12:59   


 


 


 Quetiapine


 Fumarate


  (SEROquel)  50 mg  BID


 ORAL


   11/5/18 18:00


 12/5/18 17:59  11/10/18 08:26


 


 


 Sitagliptin


 Phosphate


  (Januvia)  100 mg  DAILY


 ORAL


   11/6/18 09:00


 12/6/18 08:59  11/10/18 08:27


 


 


 Tamsulosin HCl


  (Flomax)  0.4 mg  BEDTIME


 ORAL


   11/5/18 21:00


 12/5/18 20:59  11/9/18 20:13


 


 


 Temazepam


  (Restoril)  7.5 mg  HSPRN  PRN


 ORAL


 Insomnia  11/8/18 14:00


 11/15/18 13:59   


 


 


 Ziprasidone


  (Geodon)  20 mg  TWICE A  DAY


 ORAL


   11/5/18 18:00


 12/5/18 17:59  11/10/18 08:27


 

















Sue Urbina M.D. Nov 10, 2018 11:40

## 2018-11-11 VITALS — SYSTOLIC BLOOD PRESSURE: 117 MMHG | DIASTOLIC BLOOD PRESSURE: 61 MMHG

## 2018-11-11 VITALS — DIASTOLIC BLOOD PRESSURE: 77 MMHG | SYSTOLIC BLOOD PRESSURE: 109 MMHG

## 2018-11-11 VITALS — DIASTOLIC BLOOD PRESSURE: 55 MMHG | SYSTOLIC BLOOD PRESSURE: 112 MMHG

## 2018-11-11 VITALS — DIASTOLIC BLOOD PRESSURE: 63 MMHG | SYSTOLIC BLOOD PRESSURE: 114 MMHG

## 2018-11-11 VITALS — DIASTOLIC BLOOD PRESSURE: 65 MMHG | SYSTOLIC BLOOD PRESSURE: 131 MMHG

## 2018-11-11 VITALS — SYSTOLIC BLOOD PRESSURE: 121 MMHG | DIASTOLIC BLOOD PRESSURE: 64 MMHG

## 2018-11-11 LAB
% IRON SATURATION: 9 % (ref 15–50)
ADD MANUAL DIFF: NO
ALBUMIN SERPL-MCNC: 1.9 G/DL (ref 3.4–5)
ALBUMIN/GLOB SERPL: 0.4 {RATIO} (ref 1–2.7)
ALP SERPL-CCNC: 122 U/L (ref 46–116)
ALT SERPL-CCNC: 63 U/L (ref 12–78)
ANION GAP SERPL CALC-SCNC: 6 MMOL/L (ref 5–15)
AST SERPL-CCNC: 17 U/L (ref 15–37)
BASOPHILS NFR BLD AUTO: 0.6 % (ref 0–2)
BILIRUB SERPL-MCNC: 0.8 MG/DL (ref 0.2–1)
BUN SERPL-MCNC: 25 MG/DL (ref 7–18)
CALCIUM SERPL-MCNC: 8.7 MG/DL (ref 8.5–10.1)
CHLORIDE SERPL-SCNC: 107 MMOL/L (ref 98–107)
CO2 SERPL-SCNC: 30 MMOL/L (ref 21–32)
CREAT SERPL-MCNC: 1.1 MG/DL (ref 0.55–1.3)
EOSINOPHIL NFR BLD AUTO: 1.4 % (ref 0–3)
ERYTHROCYTE [DISTWIDTH] IN BLOOD BY AUTOMATED COUNT: 10.9 % (ref 11.6–14.8)
FERRITIN SERPL-MCNC: 283 NG/ML (ref 8–388)
GLOBULIN SER-MCNC: 4.5 G/DL
HCT VFR BLD CALC: 30.7 % (ref 42–52)
HGB BLD-MCNC: 10.4 G/DL (ref 14.2–18)
IRON SERPL-MCNC: 14 UG/DL (ref 50–175)
LYMPHOCYTES NFR BLD AUTO: 11.3 % (ref 20–45)
MCV RBC AUTO: 90 FL (ref 80–99)
MONOCYTES NFR BLD AUTO: 12.7 % (ref 1–10)
NEUTROPHILS NFR BLD AUTO: 74.1 % (ref 45–75)
PLATELET # BLD: 168 K/UL (ref 150–450)
POTASSIUM SERPL-SCNC: 4.1 MMOL/L (ref 3.5–5.1)
RBC # BLD AUTO: 3.42 M/UL (ref 4.7–6.1)
SODIUM SERPL-SCNC: 143 MMOL/L (ref 136–145)
TIBC SERPL-MCNC: 156 UG/DL (ref 250–450)
UNSATURATED IRON BINDING: 142 UG/DL (ref 112–346)
WBC # BLD AUTO: 6.9 K/UL (ref 4.8–10.8)

## 2018-11-11 RX ADMIN — INSULIN ASPART SCH UNITS: 100 INJECTION, SOLUTION INTRAVENOUS; SUBCUTANEOUS at 05:46

## 2018-11-11 RX ADMIN — INSULIN ASPART SCH UNITS: 100 INJECTION, SOLUTION INTRAVENOUS; SUBCUTANEOUS at 11:41

## 2018-11-11 RX ADMIN — ZIPRASIDONE HYDROCHLORIDE SCH MG: 20 CAPSULE ORAL at 17:01

## 2018-11-11 RX ADMIN — TAMSULOSIN HYDROCHLORIDE SCH MG: 0.4 CAPSULE ORAL at 21:01

## 2018-11-11 RX ADMIN — MORPHINE SULFATE PRN MG: 2 INJECTION, SOLUTION INTRAMUSCULAR; INTRAVENOUS at 00:01

## 2018-11-11 RX ADMIN — DOCUSATE SODIUM SCH MG: 100 CAPSULE, LIQUID FILLED ORAL at 17:01

## 2018-11-11 RX ADMIN — INSULIN ASPART SCH UNITS: 100 INJECTION, SOLUTION INTRAVENOUS; SUBCUTANEOUS at 21:02

## 2018-11-11 RX ADMIN — DIVALPROEX SODIUM SCH MG: 500 TABLET, FILM COATED, EXTENDED RELEASE ORAL at 08:47

## 2018-11-11 RX ADMIN — ZIPRASIDONE HYDROCHLORIDE SCH MG: 20 CAPSULE ORAL at 08:46

## 2018-11-11 RX ADMIN — INSULIN ASPART SCH UNITS: 100 INJECTION, SOLUTION INTRAVENOUS; SUBCUTANEOUS at 17:07

## 2018-11-11 RX ADMIN — LOSARTAN POTASSIUM SCH MG: 50 TABLET, FILM COATED ORAL at 08:46

## 2018-11-11 RX ADMIN — DOCUSATE SODIUM SCH MG: 100 CAPSULE, LIQUID FILLED ORAL at 08:46

## 2018-11-11 RX ADMIN — DIVALPROEX SODIUM SCH MG: 500 TABLET, FILM COATED, EXTENDED RELEASE ORAL at 21:01

## 2018-11-11 NOTE — GENERAL PROGRESS NOTE
Assessment/Plan


Problem List:  


(1) Cholecystitis


ICD Codes:  K81.9 - Cholecystitis, unspecified


SNOMED:  87588053


(2) UGI bleed


ICD Codes:  K92.2 - Gastrointestinal hemorrhage, unspecified


SNOMED:  09557659


(3) Abdominal pain


ICD Codes:  R10.9 - Unspecified abdominal pain


SNOMED:  13622323


Qualifiers:  


   Qualified Codes:  R10.31 - Right lower quadrant pain


(4) HTN (hypertension)


ICD Codes:  I10 - Essential (primary) hypertension


SNOMED:  10418809


(5) Diabetes


ICD Codes:  E11.9 - Type 2 diabetes mellitus without complications


SNOMED:  14462057


(6) Psychiatric disorder


ICD Codes:  F99 - Mental disorder, not otherwise specified


SNOMED:  44933590, 587987197


Assessment/Plan


s/p EGD >> gastritis, bx negative for H. Pylori


s/p lap tunde, fu surgical recs


pain mgmt


zofran prn


fu labs





Subjective


ROS Limited/Unobtainable:  No


Allergies:  


Coded Allergies:  


     No Known Allergies (Unverified , 1/27/18)





Objective





Last 24 Hour Vital Signs








  Date Time  Temp Pulse Resp B/P (MAP) Pulse Ox O2 Delivery O2 Flow Rate FiO2


 


11/11/18 08:46    114/63    


 


11/11/18 08:46  87  114/63    


 


11/11/18 08:00 98.2 87 20 114/63 (80) 98   


 


11/11/18 04:00 98.9 79 18 117/61 (79) 97   


 


11/11/18 00:33 98.1       


 


11/11/18 00:27 98.1 88 18 112/55 (74) 99   


 


11/10/18 21:14      Nasal Cannula 3.0 


 


11/10/18 20:00 99.2 90 19 106/51 (69) 96   


 


11/10/18 15:56 97.4 89 18 112/64 (80) 99   


 


11/10/18 12:00 99.0 87 22 127/65 (85) 99   

















Intake and Output  


 


 11/10/18 11/11/18





 18:59 06:59


 


Intake Total 120 ml 120 ml


 


Output Total  700 ml


 


Balance 120 ml -580 ml


 


  


 


Intake Oral 120 ml 120 ml


 


Output Urine Total  700 ml


 


# Voids  1








Laboratory Tests


11/11/18 06:00: 


White Blood Count 6.9, Red Blood Count 3.42L, Hemoglobin 10.4L, Hematocrit 30.7L

, Mean Corpuscular Volume 90, Mean Corpuscular Hemoglobin 30.5, Mean 

Corpuscular Hemoglobin Concent 34.0, Red Cell Distribution Width 10.9L, 

Platelet Count 168, Mean Platelet Volume 4.7L, Neutrophils (%) (Auto) 74.1, 

Lymphocytes (%) (Auto) 11.3L, Monocytes (%) (Auto) 12.7H, Eosinophils (%) (Auto

) 1.4, Basophils (%) (Auto) 0.6, Sodium Level 143, Potassium Level 4.1, 

Chloride Level 107, Carbon Dioxide Level 30, Anion Gap 6, Blood Urea Nitrogen 

25H, Creatinine 1.1, Estimat Glomerular Filtration Rate > 60, Glucose Level 144H

, Calcium Level 8.7, Total Bilirubin 0.8, Aspartate Amino Transf (AST/SGOT) 17, 

Alanine Aminotransferase (ALT/SGPT) 63, Alkaline Phosphatase 122H, Total 

Protein 6.4, Albumin 1.9L, Globulin 4.5, Albumin/Globulin Ratio 0.4L


Height (Feet):  6


Height (Inches):  2.00


Weight (Pounds):  204


General Appearance:  no apparent distress


EENT:  normal ENT inspection


Neck:  supple


Cardiovascular:  normal rate


Respiratory/Chest:  decreased breath sounds


Abdomen:  normal bowel sounds, non tender, soft


Extremities:  non-tender











Curtis Elizondo MD Nov 11, 2018 09:07

## 2018-11-11 NOTE — PROGRESS NOTE
DATE:  11/10/2018

HISTORY OF PRESENT ILLNESS:  This is a 68-year-old patient, seen in my

office _____ abdominal pain, possible GI bleed, and abnormal labs, but he

is still very confused and disorganized with mood lability, confusion, and

disorganized thought process that is why his attending physician has

requested daily psychiatric consultation.  His cognition has declined

below baseline secondary to stress of medical illness.



MENTAL STATUS EXAMINATION:  This is a 68-year-old male.  Appearance is

disheveled.  Attitude, irritable and agitated.  Affect, guarded and

restricted.  Intellect poor.  Mood, depressed and anxious.  Motor

activity, psychomotor agitation.  Attention span is poor.  Orientation x2.

Speech is pressured.  Thought process, disorganized and illogical.

Thought content, no auditory hallucinations or paranoid delusions.

Insight and judgment is poor.



DIAGNOSIS:  Schizoaffective, bipolar type.



PLAN:  Treat him with Geodon 20 mg twice a day, Seroquel 50 mg twice a day,

and Depakote 500 mg twice a day _____.  Twenty minutes of cognitive

behavioral therapy was provided to help the patient identify his automatic

negative thoughts and to help him convert those negative thoughts to more

positive thinking to reduce depression and anxiety.  Chart reviewed.

Discussed with staff.  Seen and assessed in his room.









  ______________________________________________

  Helen Lujan M.D.





DR:  Jorge L

D:  11/10/2018 15:04

T:  11/11/2018 02:15

JOB#:  [id number]

CC:

## 2018-11-11 NOTE — GENERAL PROGRESS NOTE
Assessment/Plan


Problem List:  


(1) HTN (hypertension)


ICD Codes:  I10 - Essential (primary) hypertension


SNOMED:  97432348


(2) Diabetes


ICD Codes:  E11.9 - Type 2 diabetes mellitus without complications


SNOMED:  62527846


(3) COPD (chronic obstructive pulmonary disease)


ICD Codes:  J44.9 - Chronic obstructive pulmonary disease, unspecified


SNOMED:  14126514


(4) Psychiatric disorder


ICD Codes:  F99 - Mental disorder, not otherwise specified


SNOMED:  10191354, 628393531


(5) Parkinson disease


ICD Codes:  G20 - Parkinson's disease


SNOMED:  46161856


(6) UGI bleed


ICD Codes:  K92.2 - Gastrointestinal hemorrhage, unspecified


SNOMED:  49235453


(7) Weak


ICD Codes:  R53.1 - Weakness


SNOMED:  76159191


(8) Abdominal pain


ICD Codes:  R10.9 - Unspecified abdominal pain


SNOMED:  21302899


Qualifiers:  


   Qualified Codes:  R10.31 - Right lower quadrant pain


(9) Post-cholecystectomy syndrome


ICD Codes:  K91.5 - Postcholecystectomy syndrome


SNOMED:  55615795


Status:  stable, progressing


Assessment/Plan


adv diet ot pt diet gi f/u sx f/u cbc bmp am dc plan if clear by sx





Subjective


Constitutional:  Reports: weakness


Allergies:  


Coded Allergies:  


     No Known Allergies (Unverified , 1/27/18)


All Systems:  reviewed and negative except above


Subjective


o2nc sleeping





Objective





Last 24 Hour Vital Signs








  Date Time  Temp Pulse Resp B/P (MAP) Pulse Ox O2 Delivery O2 Flow Rate FiO2


 


11/11/18 04:00 98.9 79 18 117/61 (79) 97   


 


11/11/18 00:33 98.1       


 


11/11/18 00:27 98.1 88 18 112/55 (74) 99   


 


11/10/18 21:14      Nasal Cannula 3.0 


 


11/10/18 20:00 99.2 90 19 106/51 (69) 96   


 


11/10/18 15:56 97.4 89 18 112/64 (80) 99   


 


11/10/18 12:00 99.0 87 22 127/65 (85) 99   


 


11/10/18 09:00      Nasal Cannula 3.0 


 


11/10/18 08:27  88  132/62    


 


11/10/18 08:26    132/62    

















Intake and Output  


 


 11/10/18 11/11/18





 18:59 06:59


 


Intake Total 120 ml 120 ml


 


Output Total  700 ml


 


Balance 120 ml -580 ml


 


  


 


Intake Oral 120 ml 120 ml


 


Output Urine Total  700 ml


 


# Voids  1








Laboratory Tests


11/11/18 06:00: 


White Blood Count 6.9, Red Blood Count 3.42L, Hemoglobin 10.4L, Hematocrit 30.7L

, Mean Corpuscular Volume 90, Mean Corpuscular Hemoglobin 30.5, Mean 

Corpuscular Hemoglobin Concent 34.0, Red Cell Distribution Width 10.9L, 

Platelet Count 168, Mean Platelet Volume 4.7L, Neutrophils (%) (Auto) 74.1, 

Lymphocytes (%) (Auto) 11.3L, Monocytes (%) (Auto) 12.7H, Eosinophils (%) (Auto

) 1.4, Basophils (%) (Auto) 0.6, Sodium Level 143, Potassium Level 4.1, 

Chloride Level 107, Carbon Dioxide Level 30, Anion Gap 6, Blood Urea Nitrogen 

25H, Creatinine 1.1, Estimat Glomerular Filtration Rate > 60, Glucose Level 144H

, Calcium Level 8.7, Total Bilirubin 0.8, Aspartate Amino Transf (AST/SGOT) 17, 

Alanine Aminotransferase (ALT/SGPT) 63, Alkaline Phosphatase 122H, Total 

Protein 6.4, Albumin 1.9L, Globulin 4.5, Albumin/Globulin Ratio 0.4L


Height (Feet):  6


Height (Inches):  2.00


Weight (Pounds):  204


General Appearance:  lethargic


EENT:  normal ENT inspection


Neck:  normal alignment


Cardiovascular:  normal peripheral pulses, normal rate, regular rhythm


Respiratory/Chest:  chest wall non-tender, lungs clear, normal breath sounds


Abdomen:  normal bowel sounds, non tender, soft


Extremities:  normal inspection


Edema:  no edema noted Arm (L), no edema noted Arm (R), no edema noted Leg (L), 

no edema noted Leg (R), no edema noted Pedal (L), no edema noted Pedal (R), no 

edema noted Generalized


Neurologic:  responsive, motor weakness


Skin:  normal pigmentation, warm/dry











Jean-Pierre Henderson DO Nov 11, 2018 08:05

## 2018-11-12 VITALS — DIASTOLIC BLOOD PRESSURE: 66 MMHG | SYSTOLIC BLOOD PRESSURE: 122 MMHG

## 2018-11-12 VITALS — SYSTOLIC BLOOD PRESSURE: 125 MMHG | DIASTOLIC BLOOD PRESSURE: 64 MMHG

## 2018-11-12 VITALS — DIASTOLIC BLOOD PRESSURE: 68 MMHG | SYSTOLIC BLOOD PRESSURE: 127 MMHG

## 2018-11-12 VITALS — SYSTOLIC BLOOD PRESSURE: 123 MMHG | DIASTOLIC BLOOD PRESSURE: 64 MMHG

## 2018-11-12 VITALS — DIASTOLIC BLOOD PRESSURE: 53 MMHG | SYSTOLIC BLOOD PRESSURE: 109 MMHG

## 2018-11-12 LAB
ADD MANUAL DIFF: NO
ANION GAP SERPL CALC-SCNC: 7 MMOL/L (ref 5–15)
BASOPHILS NFR BLD AUTO: 1.4 % (ref 0–2)
BUN SERPL-MCNC: 28 MG/DL (ref 7–18)
CALCIUM SERPL-MCNC: 9 MG/DL (ref 8.5–10.1)
CHLORIDE SERPL-SCNC: 109 MMOL/L (ref 98–107)
CO2 SERPL-SCNC: 29 MMOL/L (ref 21–32)
CREAT SERPL-MCNC: 1.1 MG/DL (ref 0.55–1.3)
EOSINOPHIL NFR BLD AUTO: 3.6 % (ref 0–3)
ERYTHROCYTE [DISTWIDTH] IN BLOOD BY AUTOMATED COUNT: 11.1 % (ref 11.6–14.8)
HCT VFR BLD CALC: 34.6 % (ref 42–52)
HGB BLD-MCNC: 11.8 G/DL (ref 14.2–18)
LYMPHOCYTES NFR BLD AUTO: 20.7 % (ref 20–45)
MCV RBC AUTO: 90 FL (ref 80–99)
MONOCYTES NFR BLD AUTO: 10.9 % (ref 1–10)
NEUTROPHILS NFR BLD AUTO: 63.3 % (ref 45–75)
PLATELET # BLD: 205 K/UL (ref 150–450)
POTASSIUM SERPL-SCNC: 4 MMOL/L (ref 3.5–5.1)
RBC # BLD AUTO: 3.86 M/UL (ref 4.7–6.1)
SODIUM SERPL-SCNC: 144 MMOL/L (ref 136–145)
WBC # BLD AUTO: 3.7 K/UL (ref 4.8–10.8)

## 2018-11-12 RX ADMIN — INSULIN ASPART SCH UNITS: 100 INJECTION, SOLUTION INTRAVENOUS; SUBCUTANEOUS at 17:06

## 2018-11-12 RX ADMIN — ZIPRASIDONE HYDROCHLORIDE SCH MG: 20 CAPSULE ORAL at 08:43

## 2018-11-12 RX ADMIN — DOCUSATE SODIUM SCH MG: 100 CAPSULE, LIQUID FILLED ORAL at 08:43

## 2018-11-12 RX ADMIN — DIVALPROEX SODIUM SCH MG: 500 TABLET, FILM COATED, EXTENDED RELEASE ORAL at 08:44

## 2018-11-12 RX ADMIN — DOCUSATE SODIUM SCH MG: 100 CAPSULE, LIQUID FILLED ORAL at 17:03

## 2018-11-12 RX ADMIN — ZIPRASIDONE HYDROCHLORIDE SCH MG: 20 CAPSULE ORAL at 17:03

## 2018-11-12 RX ADMIN — INSULIN ASPART SCH UNITS: 100 INJECTION, SOLUTION INTRAVENOUS; SUBCUTANEOUS at 12:11

## 2018-11-12 RX ADMIN — INSULIN ASPART SCH UNITS: 100 INJECTION, SOLUTION INTRAVENOUS; SUBCUTANEOUS at 05:56

## 2018-11-12 RX ADMIN — LOSARTAN POTASSIUM SCH MG: 50 TABLET, FILM COATED ORAL at 08:44

## 2018-11-12 NOTE — PROGRESS NOTE
DATE:  11/12/2018

NOTE:  POOR AUDIO



SUBJECTIVE:  This is a male patient, 68-year-old, who developed pain status

post GI bleeding.  Denies nausea, vomiting, or diarrhea.  _____.



MENTAL STATUS EXAMINATION:  This is a 68-year-old male.  Appearance is

disheveled.  Attitude, irritable and agitated.  Affect, guarded and

restricted.  Intellect poor.  Mood is depressed and anxious.  Motor

activity, psychomotor agitation.  Attention span is poor.  Orientation x2.

Speech is pressured.  Thought process is disorganized and illogical.

Thought content, auditory hallucinations and paranoid delusions.  Insight

and judgment is poor.



DIAGNOSIS:  Schizoaffective, bipolar type.



PLAN:  Continue titrating the medications to stabilize his mood.  Provided

with 20 minutes of cognitive behavioral therapy to help identify automatic

negative thoughts and help her to convert the automatic negative thoughts

to more positive thoughts in order to reduce depression, anxiety, and mood

lability and also continue to take psychotropic medications _____.  Chart

reviewed and discussed with staff.  Seen and assessed in his room.









  ______________________________________________

  Helen Lujan M.D.





DR:  JONO

D:  11/12/2018 12:36

T:  11/12/2018 17:30

JOB#:  [id number]

CC:

## 2018-11-12 NOTE — GI PROGRESS NOTE
Assessment/Plan


Problems:  


(1) Cholecystitis


ICD Codes:  K81.9 - Cholecystitis, unspecified


SNOMED:  20921515


(2) Abdominal pain


ICD Codes:  R10.9 - Unspecified abdominal pain


SNOMED:  40423843


Qualifiers:  


   Qualified Codes:  R10.31 - Right lower quadrant pain


(3) Elevated LFTs


ICD Codes:  R94.5 - Abnormal results of liver function studies


SNOMED:  702277806, 561913526


(4) Fever


ICD Codes:  R50.9 - Fever, unspecified


SNOMED:  847648309


(5) Pancytopenia


ICD Codes:  D61.818 - Other pancytopenia


SNOMED:  911337315


(6) Failure to thrive


SNOMED:  03116850


Status:  stable


Status Narrative


Discussed with Dr. Elizondo.


Assessment/Plan


patient reported dark emesis >> s/p EGD >> gastritis, bx negative for H. Pylori


Hgb drop over period of 4 days, now stable


OB stool still pending


s/p lap tunde, fu surgical recs


pain mgmt


zofran prn


fu labs





The patient was seen and examined at bedside and all new and available data was 

reviewed in the patients chart. I agree with the above findings, impression 

and plan.  (Patient seen earlier today. Signature stamp does not reflect 

patient encounter time.). - Curtis Elizondo MD





Subjective


Gastrointestinal/Abdominal:  Reports: no symptoms





Objective





Last 24 Hour Vital Signs








  Date Time  Temp Pulse Resp B/P (MAP) Pulse Ox O2 Delivery O2 Flow Rate FiO2


 


11/12/18 09:00      Room Air  


 


11/12/18 08:44    122/66    


 


11/12/18 08:44  68  122/66    


 


11/12/18 08:00 98.7 68 20 122/66 (84) 99   


 


11/12/18 04:00 98.2 68 19 125/64 (84) 98   


 


11/12/18 00:00 98.3 72 19 123/64 (83) 95   


 


11/11/18 20:15      Nasal Cannula 3.0 


 


11/11/18 19:48 98.2 84 18 121/64 (83) 97   


 


11/11/18 16:00 97.3 66 21 109/77 (88) 99   


 


11/11/18 11:49 97.9 95 20 131/65 (87) 98   

















Intake and Output  


 


 11/11/18 11/12/18





 18:59 06:59


 


Intake Total 780 ml 240 ml


 


Balance 780 ml 240 ml


 


  


 


Intake Oral 720 ml 240 ml


 


IV Total 60 ml 


 


# Voids 1 3


 


# Bowel Movements  1











Laboratory Tests








Test


  11/12/18


07:20 11/12/18


08:15


 


Stool Occult Blood Pending   


 


White Blood Count


  


  3.7 K/UL


(4.8-10.8)  L


 


Red Blood Count


  


  3.86 M/UL


(4.70-6.10)  L


 


Hemoglobin


  


  11.8 G/DL


(14.2-18.0)  L


 


Hematocrit


  


  34.6 %


(42.0-52.0)  L


 


Mean Corpuscular Volume  90 FL (80-99)  


 


Mean Corpuscular Hemoglobin


  


  30.5 PG


(27.0-31.0)


 


Mean Corpuscular Hemoglobin


Concent 


  34.0 G/DL


(32.0-36.0)


 


Red Cell Distribution Width


  


  11.1 %


(11.6-14.8)  L


 


Platelet Count


  


  205 K/UL


(150-450)


 


Mean Platelet Volume


  


  5.2 FL


(6.5-10.1)  L


 


Neutrophils (%) (Auto)


  


  63.3 %


(45.0-75.0)


 


Lymphocytes (%) (Auto)


  


  20.7 %


(20.0-45.0)


 


Monocytes (%) (Auto)


  


  10.9 %


(1.0-10.0)  H


 


Eosinophils (%) (Auto)


  


  3.6 %


(0.0-3.0)  H


 


Basophils (%) (Auto)


  


  1.4 %


(0.0-2.0)


 


Sodium Level


  


  144 MMOL/L


(136-145)


 


Potassium Level


  


  4.0 MMOL/L


(3.5-5.1)


 


Chloride Level


  


  109 MMOL/L


()  H


 


Carbon Dioxide Level


  


  29 MMOL/L


(21-32)


 


Anion Gap


  


  7 mmol/L


(5-15)


 


Blood Urea Nitrogen


  


  28 mg/dL


(7-18)  H


 


Creatinine


  


  1.1 MG/DL


(0.55-1.30)


 


Estimat Glomerular Filtration


Rate 


  > 60 mL/min


(>60)


 


Glucose Level


  


  148 MG/DL


()  H


 


Calcium Level


  


  9.0 MG/DL


(8.5-10.1)








Height (Feet):  6


Height (Inches):  2.00


Weight (Pounds):  204


General Appearance:  WD/WN, no apparent distress, alert


Cardiovascular:  normal rate


Respiratory/Chest:  normal breath sounds, no respiratory distress


Abdominal Exam:  normal bowel sounds, non tender, soft


Extremities:  normal range of motion, non-tender











Silvino Jones NP Nov 12, 2018 10:36

## 2018-11-12 NOTE — INFECTIOUS DISEASES PROG NOTE
Assessment/Plan


Assessment/Plan





ASSESSMENT:  The patient is a 68-year-old male with:











Fever, low grade ( post op)


Normal white blood cells.


Acute cholecystitis


   11/8 SP  cholecystectomy ( Hydrops, no perforation)


Transaminitis, improving


   Hepatitis panel:  unremarkable


   HIDA scan Cystic duct: obstruction, suggestive of acute cholecystitis


   Ultrasound of the abdomen : Cholelithiasis 








EGD : Gastritis  11/06/2018.


Diabetes.


Schizoaffective disorder.


COPD.


Parkinson disease.


Failure to thrive.


History of left arm surgery











PLAN:





monitor pt off of AB Rx 





11/9 SP  V Zosyn day # 3  


Monitor CBC.


Monitor BMP.


Monitor liver function





Subjective


Allergies:  


Coded Allergies:  


     No Known Allergies (Unverified , 1/27/18)


Subjective








Comfortable





Objective


Vital Signs





Last 24 Hour Vital Signs








  Date Time  Temp Pulse Resp B/P (MAP) Pulse Ox O2 Delivery O2 Flow Rate FiO2


 


11/12/18 12:13 97.9 73 20 127/68 (87) 96   


 


11/12/18 09:00      Room Air  


 


11/12/18 08:44    122/66    


 


11/12/18 08:44  68  122/66    


 


11/12/18 08:00 98.7 68 20 122/66 (84) 99   


 


11/12/18 04:00 98.2 68 19 125/64 (84) 98   


 


11/12/18 00:00 98.3 72 19 123/64 (83) 95   


 


11/11/18 20:15      Nasal Cannula 3.0 


 


11/11/18 19:48 98.2 84 18 121/64 (83) 97   


 


11/11/18 16:00 97.3 66 21 109/77 (88) 99   








Height (Feet):  6


Height (Inches):  2.00


Weight (Pounds):  204


HEENT:  anicteric


Respiratory/Chest:  no respiratory distress


Cardiovascular:  normal rate


Abdomen:  soft, non tender





Laboratory Tests








Test


  11/12/18


07:20 11/12/18


08:15


 


Stool Occult Blood


  Negative


(NEGATIVE) 


 


 


White Blood Count


  


  3.7 K/UL


(4.8-10.8)  L


 


Red Blood Count


  


  3.86 M/UL


(4.70-6.10)  L


 


Hemoglobin


  


  11.8 G/DL


(14.2-18.0)  L


 


Hematocrit


  


  34.6 %


(42.0-52.0)  L


 


Mean Corpuscular Volume  90 FL (80-99)  


 


Mean Corpuscular Hemoglobin


  


  30.5 PG


(27.0-31.0)


 


Mean Corpuscular Hemoglobin


Concent 


  34.0 G/DL


(32.0-36.0)


 


Red Cell Distribution Width


  


  11.1 %


(11.6-14.8)  L


 


Platelet Count


  


  205 K/UL


(150-450)


 


Mean Platelet Volume


  


  5.2 FL


(6.5-10.1)  L


 


Neutrophils (%) (Auto)


  


  63.3 %


(45.0-75.0)


 


Lymphocytes (%) (Auto)


  


  20.7 %


(20.0-45.0)


 


Monocytes (%) (Auto)


  


  10.9 %


(1.0-10.0)  H


 


Eosinophils (%) (Auto)


  


  3.6 %


(0.0-3.0)  H


 


Basophils (%) (Auto)


  


  1.4 %


(0.0-2.0)


 


Sodium Level


  


  144 MMOL/L


(136-145)


 


Potassium Level


  


  4.0 MMOL/L


(3.5-5.1)


 


Chloride Level


  


  109 MMOL/L


()  H


 


Carbon Dioxide Level


  


  29 MMOL/L


(21-32)


 


Anion Gap


  


  7 mmol/L


(5-15)


 


Blood Urea Nitrogen


  


  28 mg/dL


(7-18)  H


 


Creatinine


  


  1.1 MG/DL


(0.55-1.30)


 


Estimat Glomerular Filtration


Rate 


  > 60 mL/min


(>60)


 


Glucose Level


  


  148 MG/DL


()  H


 


Calcium Level


  


  9.0 MG/DL


(8.5-10.1)











Current Medications








 Medications


  (Trade)  Dose


 Ordered  Sig/Erasmo


 Route


 PRN Reason  Start Time


 Stop Time Status Last Admin


Dose Admin


 


 Acetaminophen


  (Tylenol)  650 mg  Q4H  PRN


 ORAL


 fever  11/5/18 13:00


 12/5/18 12:59  11/10/18 00:09


 


 


 Al Hydroxide/Mg


 Hydroxide


  (Mylanta)  15 ml  Q6H  PRN


 ORAL


 DYSPEPSIA  11/8/18 14:00


 12/8/18 13:59   


 


 


 Amlodipine


 Besylate


  (Norvasc)  5 mg  DAILY


 ORAL


   11/6/18 09:00


 12/6/18 08:59  11/12/18 08:44


 


 


 Dextrose


  (Dextrose 50%)  25 ml  Q30M  PRN


 IV


 Hypoglycemia  11/5/18 13:15


 12/5/18 13:06   


 


 


 Dextrose


  (Dextrose 50%)  50 ml  Q30M  PRN


 IV


 hypoglycemia  11/5/18 13:15


 12/5/18 13:14   


 


 


 Diphenhydramine


 HCl


  (Benadryl)  25 mg  Q8H  PRN


 ORAL


 Itching/Pruritis  11/8/18 14:00


 12/8/18 13:59   


 


 


 Divalproex Sodium


  (Depakote ER)  500 mg  EVERY 12  HOURS


 ORAL


   11/5/18 21:00


 12/5/18 20:59  11/12/18 08:44


 


 


 Docusate Sodium


  (Colace)  100 mg  TWICE A  DAY


 ORAL


   11/8/18 18:00


 12/8/18 17:59  11/12/18 08:43


 


 


 Finasteride


  (Proscar)  5 mg  DAILY


 ORAL


   11/6/18 09:00


 12/6/18 08:59  11/12/18 08:44


 


 


 Insulin Aspart


  (NovoLOG)    BEFORE MEALS AND  HS


 SUBQ


   11/5/18 16:30


 12/5/18 16:29  11/12/18 12:11


 


 


 Ketorolac


 Tromethamine


  (Toradol 30mg)  15 mg  Q6H  PRN


 IV


 Breakthrough Pain  11/9/18 08:00


 11/13/18 13:59   


 


 


 Lorazepam


  (Ativan)  1 mg  Q6H  PRN


 ORAL


 For Anxiety  11/8/18 03:45


 11/15/18 03:44   


 


 


 Losartan Potassium


  (Cozaar)  50 mg  DAILY


 ORAL


   11/6/18 09:00


 12/6/18 08:59  11/12/18 08:44


 


 


 Morphine Sulfate


  (Morphine


 Sulfate)  1 mg  Q4H  PRN


 IVP


 pain scale 1-3  11/8/18 14:00


 11/15/18 13:59   


 


 


 Morphine Sulfate


  (Morphine


 Sulfate)  2 mg  Q4H  PRN


 IVP


 Moderate Pain (Pain Scale 4-6)  11/9/18 14:00


 11/16/18 13:59  11/11/18 00:01


 


 


 Morphine Sulfate


  (Morphine


 Sulfate)  4 mg  Q4H  PRN


 IVP


 pain score 7-10  11/8/18 14:00


 11/15/18 13:59  11/9/18 22:33


 


 


 Nitroglycerin


  (Ntg)  0.4 mg  Q5M X 3 DOSES PRN


 SL


 Prn Chest Pain  11/5/18 13:00


 12/5/18 12:59   


 


 


 Ondansetron HCl


  (Zofran)  4 mg  Q6H  PRN


 IVP


 Nausea & Vomiting  11/5/18 13:00


 12/5/18 12:59  11/8/18 18:39


 


 


 Polyethylene


 Glycol


  (Miralax)  17 gm  HSPRN  PRN


 ORAL


 Constipation  11/5/18 13:00


 12/5/18 12:59   


 


 


 Quetiapine


 Fumarate


  (SEROquel)  50 mg  BID


 ORAL


   11/5/18 18:00


 12/5/18 17:59  11/12/18 08:43


 


 


 Sitagliptin


 Phosphate


  (Januvia)  100 mg  DAILY


 ORAL


   11/6/18 09:00


 12/6/18 08:59  11/12/18 08:43


 


 


 Tamsulosin HCl


  (Flomax)  0.4 mg  BEDTIME


 ORAL


   11/5/18 21:00


 12/5/18 20:59  11/11/18 21:01


 


 


 Temazepam


  (Restoril)  7.5 mg  HSPRN  PRN


 ORAL


 Insomnia  11/8/18 14:00


 11/15/18 13:59   


 


 


 Ziprasidone


  (Geodon)  20 mg  TWICE A  DAY


 ORAL


   11/5/18 18:00


 12/5/18 17:59  11/12/18 08:43


 

















Rashid Bangura MD Nov 12, 2018 14:10

## 2018-11-12 NOTE — GENERAL PROGRESS NOTE
Assessment/Plan


Problem List:  


(1) HTN (hypertension)


ICD Codes:  I10 - Essential (primary) hypertension


SNOMED:  82286056


(2) Diabetes


ICD Codes:  E11.9 - Type 2 diabetes mellitus without complications


SNOMED:  67747191


(3) COPD (chronic obstructive pulmonary disease)


ICD Codes:  J44.9 - Chronic obstructive pulmonary disease, unspecified


SNOMED:  09956361


(4) Psychiatric disorder


ICD Codes:  F99 - Mental disorder, not otherwise specified


SNOMED:  74508735, 561921719


(5) Parkinson disease


ICD Codes:  G20 - Parkinson's disease


SNOMED:  18050013


(6) UGI bleed


ICD Codes:  K92.2 - Gastrointestinal hemorrhage, unspecified


SNOMED:  93461087


(7) Weak


ICD Codes:  R53.1 - Weakness


SNOMED:  20800168


(8) Abdominal pain


ICD Codes:  R10.9 - Unspecified abdominal pain


SNOMED:  43480180


Qualifiers:  


   Qualified Codes:  R10.31 - Right lower quadrant pain


(9) Post-cholecystectomy syndrome


ICD Codes:  K91.5 - Postcholecystectomy syndrome


SNOMED:  61940088


Status:  stable, progressing


Assessment/Plan


adv diet ot pt diet gi f/u sx f/u dc if clear





Subjective


Constitutional:  Reports: weakness


Allergies:  


Coded Allergies:  


     No Known Allergies (Unverified , 1/27/18)


All Systems:  reviewed and negative except above


Subjective


calm sleepy





Objective





Last 24 Hour Vital Signs








  Date Time  Temp Pulse Resp B/P (MAP) Pulse Ox O2 Delivery O2 Flow Rate FiO2


 


11/12/18 12:13 97.9 73 20 127/68 (87) 96   


 


11/12/18 09:00      Room Air  


 


11/12/18 08:44    122/66    


 


11/12/18 08:44  68  122/66    


 


11/12/18 08:00 98.7 68 20 122/66 (84) 99   


 


11/12/18 04:00 98.2 68 19 125/64 (84) 98   


 


11/12/18 00:00 98.3 72 19 123/64 (83) 95   


 


11/11/18 20:15      Nasal Cannula 3.0 


 


11/11/18 19:48 98.2 84 18 121/64 (83) 97   


 


11/11/18 16:00 97.3 66 21 109/77 (88) 99   

















Intake and Output  


 


 11/11/18 11/12/18





 18:59 06:59


 


Intake Total 780 ml 240 ml


 


Balance 780 ml 240 ml


 


  


 


Intake Oral 720 ml 240 ml


 


IV Total 60 ml 


 


# Voids 1 3


 


# Bowel Movements  1








Laboratory Tests


11/12/18 07:20: Stool Occult Blood Negative


11/12/18 08:15: 


White Blood Count 3.7L, Red Blood Count 3.86L, Hemoglobin 11.8L, Hematocrit 

34.6L, Mean Corpuscular Volume 90, Mean Corpuscular Hemoglobin 30.5, Mean 

Corpuscular Hemoglobin Concent 34.0, Red Cell Distribution Width 11.1L, 

Platelet Count 205, Mean Platelet Volume 5.2L, Neutrophils (%) (Auto) 63.3, 

Lymphocytes (%) (Auto) 20.7, Monocytes (%) (Auto) 10.9H, Eosinophils (%) (Auto) 

3.6H, Basophils (%) (Auto) 1.4, Sodium Level 144, Potassium Level 4.0, Chloride 

Level 109H, Carbon Dioxide Level 29, Anion Gap 7, Blood Urea Nitrogen 28H, 

Creatinine 1.1, Estimat Glomerular Filtration Rate > 60, Glucose Level 148H, 

Calcium Level 9.0


Height (Feet):  6


Height (Inches):  2.00


Weight (Pounds):  204


General Appearance:  lethargic


EENT:  normal ENT inspection


Neck:  normal alignment


Cardiovascular:  normal peripheral pulses, normal rate, regular rhythm


Respiratory/Chest:  chest wall non-tender, lungs clear, normal breath sounds


Abdomen:  normal bowel sounds, non tender, soft


Extremities:  normal inspection


Edema:  no edema noted Arm (L), no edema noted Arm (R), no edema noted Leg (L), 

no edema noted Leg (R), no edema noted Pedal (L), no edema noted Pedal (R), no 

edema noted Generalized


Neurologic:  responsive, motor weakness


Skin:  normal pigmentation, warm/dry











Jean-Pierre Henderson DO Nov 12, 2018 12:58

## 2018-11-12 NOTE — PULMONOLOGY PROGRESS NOTE
Assessment/Plan


Problems:  


(1) Abdominal pain


(2) COPD (chronic obstructive pulmonary disease)


(3) CAD (coronary artery disease)


(4) Psychiatric disorder


(5) Parkinson disease


(6) Failure to thrive


Assessment/Plan


ate better


no new complains


all reviewed


iv fluids


check electrolytes


symptomatic treatment


respiratory treatment


dc planning





Subjective


ROS Limited/Unobtainable:  No


Constitutional:  Reports: no symptoms


Allergies:  


Coded Allergies:  


     No Known Allergies (Unverified , 1/27/18)





Objective





Last 24 Hour Vital Signs








  Date Time  Temp Pulse Resp B/P (MAP) Pulse Ox O2 Delivery O2 Flow Rate FiO2


 


11/12/18 12:13 97.9 73 20 127/68 (87) 96   


 


11/12/18 09:00      Room Air  


 


11/12/18 08:44    122/66    


 


11/12/18 08:44  68  122/66    


 


11/12/18 08:00 98.7 68 20 122/66 (84) 99   


 


11/12/18 04:00 98.2 68 19 125/64 (84) 98   


 


11/12/18 00:00 98.3 72 19 123/64 (83) 95   


 


11/11/18 20:15      Nasal Cannula 3.0 


 


11/11/18 19:48 98.2 84 18 121/64 (83) 97   


 


11/11/18 16:00 97.3 66 21 109/77 (88) 99   

















Intake and Output  


 


 11/11/18 11/12/18





 19:00 07:00


 


Intake Total 780 ml 240 ml


 


Balance 780 ml 240 ml


 


  


 


Intake Oral 720 ml 240 ml


 


IV Total 60 ml 


 


# Voids 1 3


 


# Bowel Movements  1








General Appearance:  WD/WN


HEENT:  normocephalic, anicteric


Respiratory/Chest:  lungs clear, normal breath sounds


Cardiovascular:  normal peripheral pulses, regular rhythm


Abdomen:  soft, non tender, non distended


Extremities:  no cyanosis


Neurologic/Psychiatric:  CNs II-XII grossly normal, no motor/sensory deficits


Laboratory Tests


11/12/18 07:20: Stool Occult Blood Negative


11/12/18 08:15: 


White Blood Count 3.7L, Red Blood Count 3.86L, Hemoglobin 11.8L, Hematocrit 

34.6L, Mean Corpuscular Volume 90, Mean Corpuscular Hemoglobin 30.5, Mean 

Corpuscular Hemoglobin Concent 34.0, Red Cell Distribution Width 11.1L, 

Platelet Count 205, Mean Platelet Volume 5.2L, Neutrophils (%) (Auto) 63.3, 

Lymphocytes (%) (Auto) 20.7, Monocytes (%) (Auto) 10.9H, Eosinophils (%) (Auto) 

3.6H, Basophils (%) (Auto) 1.4, Sodium Level 144, Potassium Level 4.0, Chloride 

Level 109H, Carbon Dioxide Level 29, Anion Gap 7, Blood Urea Nitrogen 28H, 

Creatinine 1.1, Estimat Glomerular Filtration Rate > 60, Glucose Level 148H, 

Calcium Level 9.0





Current Medications








 Medications


  (Trade)  Dose


 Ordered  Sig/Erasmo


 Route


 PRN Reason  Start Time


 Stop Time Status Last Admin


Dose Admin


 


 Acetaminophen


  (Tylenol)  650 mg  Q4H  PRN


 ORAL


 fever  11/5/18 13:00


 12/5/18 12:59  11/10/18 00:09


 


 


 Al Hydroxide/Mg


 Hydroxide


  (Mylanta)  15 ml  Q6H  PRN


 ORAL


 DYSPEPSIA  11/8/18 14:00


 12/8/18 13:59   


 


 


 Amlodipine


 Besylate


  (Norvasc)  5 mg  DAILY


 ORAL


   11/6/18 09:00


 12/6/18 08:59  11/12/18 08:44


 


 


 Dextrose


  (Dextrose 50%)  25 ml  Q30M  PRN


 IV


 Hypoglycemia  11/5/18 13:15


 12/5/18 13:06   


 


 


 Dextrose


  (Dextrose 50%)  50 ml  Q30M  PRN


 IV


 hypoglycemia  11/5/18 13:15


 12/5/18 13:14   


 


 


 Diphenhydramine


 HCl


  (Benadryl)  25 mg  Q8H  PRN


 ORAL


 Itching/Pruritis  11/8/18 14:00


 12/8/18 13:59   


 


 


 Divalproex Sodium


  (Depakote ER)  500 mg  EVERY 12  HOURS


 ORAL


   11/5/18 21:00


 12/5/18 20:59  11/12/18 08:44


 


 


 Docusate Sodium


  (Colace)  100 mg  TWICE A  DAY


 ORAL


   11/8/18 18:00


 12/8/18 17:59  11/12/18 08:43


 


 


 Finasteride


  (Proscar)  5 mg  DAILY


 ORAL


   11/6/18 09:00


 12/6/18 08:59  11/12/18 08:44


 


 


 Insulin Aspart


  (NovoLOG)    BEFORE MEALS AND  HS


 SUBQ


   11/5/18 16:30


 12/5/18 16:29  11/12/18 12:11


 


 


 Ketorolac


 Tromethamine


  (Toradol 30mg)  15 mg  Q6H  PRN


 IV


 Breakthrough Pain  11/9/18 08:00


 11/13/18 13:59   


 


 


 Lorazepam


  (Ativan)  1 mg  Q6H  PRN


 ORAL


 For Anxiety  11/8/18 03:45


 11/15/18 03:44   


 


 


 Losartan Potassium


  (Cozaar)  50 mg  DAILY


 ORAL


   11/6/18 09:00


 12/6/18 08:59  11/12/18 08:44


 


 


 Morphine Sulfate


  (Morphine


 Sulfate)  1 mg  Q4H  PRN


 IVP


 pain scale 1-3  11/8/18 14:00


 11/15/18 13:59   


 


 


 Morphine Sulfate


  (Morphine


 Sulfate)  2 mg  Q4H  PRN


 IVP


 Moderate Pain (Pain Scale 4-6)  11/9/18 14:00


 11/16/18 13:59  11/11/18 00:01


 


 


 Morphine Sulfate


  (Morphine


 Sulfate)  4 mg  Q4H  PRN


 IVP


 pain score 7-10  11/8/18 14:00


 11/15/18 13:59  11/9/18 22:33


 


 


 Nitroglycerin


  (Ntg)  0.4 mg  Q5M X 3 DOSES PRN


 SL


 Prn Chest Pain  11/5/18 13:00


 12/5/18 12:59   


 


 


 Ondansetron HCl


  (Zofran)  4 mg  Q6H  PRN


 IVP


 Nausea & Vomiting  11/5/18 13:00


 12/5/18 12:59  11/8/18 18:39


 


 


 Polyethylene


 Glycol


  (Miralax)  17 gm  HSPRN  PRN


 ORAL


 Constipation  11/5/18 13:00


 12/5/18 12:59   


 


 


 Quetiapine


 Fumarate


  (SEROquel)  50 mg  BID


 ORAL


   11/5/18 18:00


 12/5/18 17:59  11/12/18 08:43


 


 


 Sitagliptin


 Phosphate


  (Januvia)  100 mg  DAILY


 ORAL


   11/6/18 09:00


 12/6/18 08:59  11/12/18 08:43


 


 


 Tamsulosin HCl


  (Flomax)  0.4 mg  BEDTIME


 ORAL


   11/5/18 21:00


 12/5/18 20:59  11/11/18 21:01


 


 


 Temazepam


  (Restoril)  7.5 mg  HSPRN  PRN


 ORAL


 Insomnia  11/8/18 14:00


 11/15/18 13:59   


 


 


 Ziprasidone


  (Geodon)  20 mg  TWICE A  DAY


 ORAL


   11/5/18 18:00


 12/5/18 17:59  11/12/18 08:43


 

















Kavya Joshi MD Nov 12, 2018 14:19

## 2018-11-13 NOTE — DISCHARGE SUMMARY
Discharge Summary


Discharge Summary


_


DATE OF ADMISSION: 11/05/2018





DATE OF DISCHARGE: 11/12/2018





REASON FOR ADMISSION: 


68 years old male with past medical history of hypertension, COPD, diabetes 

mellitus,  bipolar disorder,  presented to emergency department  with  vomiting 

and abdominal pain for the  last 3 days.  


Patient had been vomiting dark material and had difficulty keeping anything 

down.  


Patient reported bowel movement, but denied   bright red  blood per rectum or  

dark stools.  


Laboratory workup revealed no leukocytosis stable hemoglobin and hematocrit.  


Stable electrolytes.  BUN  21 creatinine 1.2


, ALT  465.  


Troponin negative 


 


Lipase 109. 


EKG revealed normal sinus rhythm, no  acute ischemic changes .


Chest x-ray revealed no acute cardiopulmonary pathology.  


CT of the abdomen and pelvis revealed cholelithiasis, Cholecystitis was  not 

excluded.  Mild perinephric stranding nonspecific.  Atherosclerotic disease.  


Patient started on empiric antibiotic . Patient  was  provided with analgesia . 


Patient was admitted for further management  with diagnosis of upper GI bleeding

, elevated LFT ,abdominal pain. 





CONSULTANTS:


pulmonary Dr. Joshi


ID specialist Dr. Bangura


GI specialist Dr. Elizondo


surgery Dr. Kellogg 


psychiatrist Dr. Lujan


 


[facility] COURSE: 


Patient admitted  , started on IV fluids and continued on empiric antibiotics. 


Patient was  kept nothing by mouth.  


ID and surgery consults  were requested.  


Patient subsequently on 11/ 6 undergone upper endoscopy   with findings of  

gastritis, status post biopsy, irregular Z line and  small hiatal hernia.   


Biopsy of gastric antrum revealed  mild chronic gastritis , but  no 

Helicobacter infection.  


Hemoglobin and hematocrit were closely monitored with goal to keep hemoglobin 

above 7. 


Anemia workup revealed low  iron. 


Patient received 1 dose of Venofer. 


Stool for occult blood was negative.  


Hepatitis panel was negative.


LFT were closely monitored.  


Abdominal ultrasound revealed cholelithiasis and liver calcification.


Patient continued to have   nausea  and unable to tolerate diet. 


HIDA scan demonstrated   cystic duct obstruction,  consistent with acute 

cholecystitis.  


Surgeon discussed findings  with the patient , who consented for surgery.  


Patient subsequently undergone   laparoscopic cholecystectomy with umbilical 

hernia repair on 11/ 8.  


Pain management was addressed, and pain was controlled .


Bowel regimen instituted. Patient had bowel movement .


Patient slowly started on diet , and was able to tolerate it.


Antiemetics provided as needed. 


Patient was working with  physical and occupational therapists.  


Supplemental oxygen  and pulmonary toilet provided as needed.  


Incentive spirometry encouraged while in the bed.  


Blood pressure was managed with ARB.  


Blood sugar was managed with Januvia and sliding scale of  insulin as needed. 


LFT trended down.  


GI prophylaxis provided.  


Psychiatrist  seen and evaluated the patient,  diagnosed patient with  

schizoaffective bipolar type . 


Psychiatric medication regimen was  optimized.  


Supportive therapy provided.  


Patient clinically stabilized and was  ready for discharge back to skilled 

nursing facility for continuation of care. 





FINAL DIAGNOSES: 


Upper GI bleeding 


Status post EGD with biopsy


Gastritis


Anemia


Acute cholecystitis


Umbilical hernia


Status post laparoscopic cholecystectomy with umbilical hernia repair


COPD


Hypertension


Diabetes mellitus


Schizoaffective disorder bipolar type 





DISCHARGE MEDICATIONS:


See Medication Reconciliation list.





DISCHARGE INSTRUCTIONS:


Patient was discharged to the skilled nursing facility. 


Follow up with medical doctor at the facility.











Shima De La Cruz NP Nov 13, 2018 13:58

## 2018-11-16 ENCOUNTER — HOSPITAL ENCOUNTER (EMERGENCY)
Dept: HOSPITAL 72 - EMR | Age: 68
Discharge: HOME | End: 2018-11-16
Payer: MEDICARE

## 2018-11-16 VITALS — SYSTOLIC BLOOD PRESSURE: 156 MMHG | DIASTOLIC BLOOD PRESSURE: 62 MMHG

## 2018-11-16 VITALS — SYSTOLIC BLOOD PRESSURE: 118 MMHG | DIASTOLIC BLOOD PRESSURE: 45 MMHG

## 2018-11-16 VITALS — SYSTOLIC BLOOD PRESSURE: 132 MMHG | DIASTOLIC BLOOD PRESSURE: 60 MMHG

## 2018-11-16 VITALS — DIASTOLIC BLOOD PRESSURE: 74 MMHG | SYSTOLIC BLOOD PRESSURE: 143 MMHG

## 2018-11-16 VITALS — WEIGHT: 190 LBS | HEIGHT: 75 IN | BODY MASS INDEX: 23.62 KG/M2

## 2018-11-16 VITALS — DIASTOLIC BLOOD PRESSURE: 60 MMHG | SYSTOLIC BLOOD PRESSURE: 132 MMHG

## 2018-11-16 DIAGNOSIS — G20: ICD-10-CM

## 2018-11-16 DIAGNOSIS — E11.9: ICD-10-CM

## 2018-11-16 DIAGNOSIS — R10.12: Primary | ICD-10-CM

## 2018-11-16 DIAGNOSIS — I10: ICD-10-CM

## 2018-11-16 LAB
ADD MANUAL DIFF: YES
ALBUMIN SERPL-MCNC: 3.3 G/DL (ref 3.4–5)
ALBUMIN/GLOB SERPL: 0.6 {RATIO} (ref 1–2.7)
ALP SERPL-CCNC: 152 U/L (ref 46–116)
ALT SERPL-CCNC: 45 U/L (ref 12–78)
ANION GAP SERPL CALC-SCNC: 13 MMOL/L (ref 5–15)
APPEARANCE UR: CLEAR
APTT PPP: YELLOW S
AST SERPL-CCNC: 28 U/L (ref 15–37)
BILIRUB SERPL-MCNC: 0.7 MG/DL (ref 0.2–1)
BUN SERPL-MCNC: 23 MG/DL (ref 7–18)
CALCIUM SERPL-MCNC: 9.4 MG/DL (ref 8.5–10.1)
CHLORIDE SERPL-SCNC: 104 MMOL/L (ref 98–107)
CO2 SERPL-SCNC: 25 MMOL/L (ref 21–32)
CREAT SERPL-MCNC: 1.1 MG/DL (ref 0.55–1.3)
ERYTHROCYTE [DISTWIDTH] IN BLOOD BY AUTOMATED COUNT: 11.3 % (ref 11.6–14.8)
GLOBULIN SER-MCNC: 5.5 G/DL
GLUCOSE UR STRIP-MCNC: NEGATIVE MG/DL
HCT VFR BLD CALC: 37 % (ref 42–52)
HGB BLD-MCNC: 12.6 G/DL (ref 14.2–18)
KETONES UR QL STRIP: (no result)
LEUKOCYTE ESTERASE UR QL STRIP: NEGATIVE
MCV RBC AUTO: 89 FL (ref 80–99)
NITRITE UR QL STRIP: NEGATIVE
PH UR STRIP: 8 [PH] (ref 4.5–8)
PLATELET # BLD: 308 K/UL (ref 150–450)
POTASSIUM SERPL-SCNC: 4.3 MMOL/L (ref 3.5–5.1)
PROT UR QL STRIP: NEGATIVE
RBC # BLD AUTO: 4.16 M/UL (ref 4.7–6.1)
SODIUM SERPL-SCNC: 142 MMOL/L (ref 136–145)
SP GR UR STRIP: 1.01 (ref 1–1.03)
UROBILINOGEN UR-MCNC: NORMAL MG/DL (ref 0–1)
WBC # BLD AUTO: 9.6 K/UL (ref 4.8–10.8)

## 2018-11-16 PROCEDURE — 83690 ASSAY OF LIPASE: CPT

## 2018-11-16 PROCEDURE — 85007 BL SMEAR W/DIFF WBC COUNT: CPT

## 2018-11-16 PROCEDURE — 99284 EMERGENCY DEPT VISIT MOD MDM: CPT

## 2018-11-16 PROCEDURE — 96374 THER/PROPH/DIAG INJ IV PUSH: CPT

## 2018-11-16 PROCEDURE — 85025 COMPLETE CBC W/AUTO DIFF WBC: CPT

## 2018-11-16 PROCEDURE — 36415 COLL VENOUS BLD VENIPUNCTURE: CPT

## 2018-11-16 PROCEDURE — 80053 COMPREHEN METABOLIC PANEL: CPT

## 2018-11-16 PROCEDURE — 96361 HYDRATE IV INFUSION ADD-ON: CPT

## 2018-11-16 PROCEDURE — 81003 URINALYSIS AUTO W/O SCOPE: CPT

## 2018-11-16 PROCEDURE — 74176 CT ABD & PELVIS W/O CONTRAST: CPT

## 2018-11-16 NOTE — EMERGENCY ROOM REPORT
History of Present Illness


General


Chief Complaint:  Abdominal Pain


Source:  Patient





Present Illness


HPI


This is a 68-year-old male who has had a recent laparoscopic cholecystectomy, 

who presents with intermittent left upper quadrant abdominal pain that radiates 

to his chest.  He states when he belches it hurts.  He describes it as a 

burning like sensation.  He denies any vomiting, diarrhea, fever.  No other 

associated symptoms.  Onset this morning.


Allergies:  


Coded Allergies:  


     No Known Allergies (Unverified , 1/27/18)





Patient History


Past Surgical History:  tunde


Pertinent Family History:  none





Nursing Documentation-PMH


Past Medical History:  No History, Except For


Hx Hypertension:  Yes


Hx COPD:  Yes


Hx Diabetes:  Yes


Hx Cancer:  No


Hx Gastrointestinal Problems:  No - gerd


Hx Neurological Problems:  Yes - PARKINSONS


Hx Parkinson's Disease:  Yes


Hx Seizures:  Yes





Review of Systems


All Other Systems:  negative except mentioned in HPI





Physical Exam





Vital Signs








  Date Time  Temp Pulse Resp B/P (MAP) Pulse Ox O2 Delivery O2 Flow Rate FiO2


 


11/16/18 13:27 98.2 85 18 155/75 98 Room Air  








General Appearance:  no apparent distress, alert, GCS 15, non-toxic


Neck:  full range of motion, supple/symm/no masses


Respiratory:  chest non-tender, lungs clear, normal breath sounds, speaking 

full sentences


Cardiovascular #1:  regular rate, rhythm, no edema


Gastrointestinal:  normal inspection, normal bowel sounds, soft - Very minimum 

abdominal tenderness on the right upper quadrant, other - Surgical scars are 

clear, dry, intact


Neurologic:  alert, oriented x3, responsive, motor strength/tone normal, 

sensory intact, speech normal





Medical Decision Making


Diagnostic Impression:  


 Primary Impression:  


 Abdominal pain


ER Course


Patient presented with significant complexity or wrists requiring multiple 

bedside evaluations.  The patient has absolutely no abdominal tenderness on my 

repeat examination.  He started by mouth challenge.  Blood work was reviewed.  

Differential diagnosis included, appendicitis, diverticulitis, intra-abdominal 

abscess, wound infection, post surgical consultation.  I also considered 

abdominal aortic aneurysm, mesenteric ischemia, urinary tract infection and 

sepsis.  Patient's blood work was reviewed.  At this time, the patient will be 

started on pain medication at home for close follow-up with his primary care 

physician and to return if any change in symptoms or worsening symptoms.





Laboratory Tests








Test


  11/16/18


13:50 11/16/18


14:20


 


Urine Color Yellow   


 


Urine Appearance Clear   


 


Urine pH 8 (4.5-8.0)   


 


Urine Specific Gravity


  1.010


(1.005-1.035) 


 


 


Urine Protein


  Negative


(NEGATIVE) 


 


 


Urine Glucose (UA)


  Negative


(NEGATIVE) 


 


 


Urine Ketones


  1+ (NEGATIVE)


H 


 


 


Urine Blood


  1+ (NEGATIVE)


H 


 


 


Urine Nitrite


  Negative


(NEGATIVE) 


 


 


Urine Bilirubin


  Negative


(NEGATIVE) 


 


 


Urine Urobilinogen


  Normal MG/DL


(0.0-1.0) 


 


 


Urine Leukocyte Esterase


  Negative


(NEGATIVE) 


 


 


Urine RBC


  0-2 /HPF (0 -


0)  H 


 


 


Urine WBC


  0 /HPF (0 - 0)


  


 


 


Urine Squamous Epithelial


Cells Occasional


/LPF 


 


 


Urine Bacteria


  None /HPF


(NONE) 


 


 


Sodium Level


  142 MMOL/L


(136-145) 


 


 


Potassium Level


  4.3 MMOL/L


(3.5-5.1) 


 


 


Chloride Level


  104 MMOL/L


() 


 


 


Carbon Dioxide Level


  25 MMOL/L


(21-32) 


 


 


Anion Gap


  13 mmol/L


(5-15) 


 


 


Blood Urea Nitrogen


  23 mg/dL


(7-18)  H 


 


 


Creatinine


  1.1 MG/DL


(0.55-1.30) 


 


 


Estimate Glomerular


Filtration Rate > 60 mL/min


(>60) 


 


 


Glucose Level


  179 MG/DL


()  H 


 


 


Calcium Level


  9.4 MG/DL


(8.5-10.1) 


 


 


Total Bilirubin


  0.7 MG/DL


(0.2-1.0) 


 


 


Aspartate Amino Transferase


(AST) 28 U/L (15-37)


  


 


 


Alanine Aminotransferase (ALT)


  45 U/L (12-78)


  


 


 


Alkaline Phosphatase


  152 U/L


()  H 


 


 


Total Protein


  8.8 G/DL


(6.4-8.2)  H 


 


 


Albumin


  3.3 G/DL


(3.4-5.0)  L 


 


 


Globulin 5.5 g/dL   


 


Albumin/Globulin Ratio


  0.6 (1.0-2.7)


L 


 


 


Lipase


  100 U/L


() 


 


 


White Blood Count


  


  9.6 K/UL


(4.8-10.8)


 


Red Blood Count


  


  4.16 M/UL


(4.70-6.10)  L


 


Hemoglobin


  


  12.6 G/DL


(14.2-18.0)  L


 


Hematocrit


  


  37.0 %


(42.0-52.0)  L


 


Mean Corpuscular Volume  89 FL (80-99)  


 


Mean Corpuscular Hemoglobin


  


  30.3 PG


(27.0-31.0)


 


Mean Corpuscular Hemoglobin


Concent 


  34.0 G/DL


(32.0-36.0)


 


Red Cell Distribution Width


  


  11.3 %


(11.6-14.8)  L


 


Platelet Count


  


  308 K/UL


(150-450)


 


Mean Platelet Volume


  


  5.6 FL


(6.5-10.1)  L


 


Neutrophils (%) (Auto)


  


  % (45.0-75.0)


 


 


Lymphocytes (%) (Auto)


  


  % (20.0-45.0)


 


 


Monocytes (%) (Auto)   % (1.0-10.0)  


 


Eosinophils (%) (Auto)   % (0.0-3.0)  


 


Basophils (%) (Auto)   % (0.0-2.0)  


 


Differential Total Cells


Counted 


  100  


 


 


Neutrophils % (Manual)  88 % (45-75)  H


 


Lymphocytes % (Manual)  7 % (20-45)  L


 


Monocytes % (Manual)  3 % (1-10)  


 


Eosinophils % (Manual)  1 % (0-3)  


 


Basophils % (Manual)  1 % (0-2)  


 


Band Neutrophils  0 % (0-8)  


 


Platelet Estimate  Adequate  


 


Platelet Morphology  Normal  


 


Red Blood Cell Morphology  Normal  











Last Vital Signs








  Date Time  Temp Pulse Resp B/P (MAP) Pulse Ox O2 Delivery O2 Flow Rate FiO2


 


11/16/18 13:54 98.2 100 16 156/62 98 Room Air  








Status:  improved


Disposition:  HOME, SELF-CARE


Condition:  Stable


Scripts


Famotidine (PEPCID) 40 Mg/5 Ml Oral.susp


40 MG PO DAILY, #20 ML


   Prov: ZIYAD PEARSON         11/16/18 


Dicyclomine Hcl (BENTYL) 10 Mg/1 Ml Ampul


10 MG IM TID, #14 AMP


   Prov: ZIYAD PEARSON         11/16/18


Referrals:  


Jean-Pierre Henderson DO (PCP)


Patient Instructions:  Abdominal Pain, Adult











ZIYAD PEARSON Nov 16, 2018 14:52

## 2018-11-16 NOTE — DIAGNOSTIC IMAGING REPORT
Indication: Right-sided sharp abdominal and flank pain radiating to the right

shoulder

 

Technique: Spiral acquisitions obtained through the abdomen and pelvis. No oral

contrast utilized, per emergency room physician request No IV contrast utilized,  per

emergency room physician request.. Multiplanar reconstructions were generated. Total

dose length product 779.85 mGycm. CTDIvol(s) 14.12 mGy. Dose reduction achieved using

automated exposure control

 

 

Comparison: 11/5/2018 contrast study

 

Findings: Interim cholecystectomy. There is a fluid collection in the gallbladder

fossa which measures 4.9 x 3.8 x 4.3 cm. A small amount of free fluid is seen

surrounding the liver. Free fluid is also seen within the pelvis. A few tiny gas

bubbles are seen in the right upper quadrant anterior abdominal wall at and below the

costal margin. Some inflammatory stranding of the colonic mesentery is noted adjacent

to the gallbladder fossa.

 

Lack of IV contrast limits assessment of the solid organs. A calcification is seen

within or adjacent to the capsule of the right hepatic lobe segment 8, also evident

previously. No focal liver lesions otherwise. No biliary ductal dilatation. The

pancreas is unremarkable. The spleen is borderline enlarged, measuring 13.6 cm long

axis dimension. The adrenals are unremarkable. The kidneys demonstrate bilateral

perinephric fat stranding, as previously. Previously demonstrated hypoattenuating

parenchymal lesions are not clearly evident on current noninfused exam. No

hydronephrosis, renal or ureteral calculi demonstrated. Not completely excludable but

also possible. There is slight bladder wall thickening despite its being distended.

 

No evidence of diverticulosis or diverticulitis. The appendix is normal. No small

bowel distention. No free intraperitoneal gas. Distal esophagus, stomach, duodenum

are unremarkable. Tiny fat-containing right inguinal hernia again demonstrated

 

The included lung bases are clear except for minimal posterior dependent pulmonary

atelectatic changes. There is a small anterior wall pericardial effusion, not clearly

evident previously. The bones demonstrate degenerative spondylosis changes. There is

again demonstrated is bilateral L4 spondylolysis and resultant grade 1 L4 on L5

spondylolisthesis. There is increased attenuation of the anterior abdominal wall

subcutaneous fat

 

Impression: Interim cholecystectomy since previous exam of 11/5/2018

 

4.9 x 3.8 x 4.3 cm fluid collection within the gallbladder fossa. Most likely

represent routine postoperative fluid. Biloma or infected collections cannot

completely excludable, however

 

Free intraperitoneal fluid, most likely related to recent surgery. Fluid secondary to

bile leak

 

Gas bubbles within the anterior abdominal wall the right upper quadrant, presumably

related to recent surgical exposure

 

Slight stranding of the anterior abdominal wall subcutaneous fat, could be related to

the recent surgery

 

Borderline splenomegaly

 

New finding of small anterior wall pericardial effusion

 

Bilateral perinephric fat stranding. Nonspecific as regards etiology but unchanged

since previous study

 

Bilateral L4 spondylolysis and result in grade 1 L4 and L5 spondylolisthesis, also

previously described

 

Incidental findings as noted, including degenerative spondylosis, dependent pulmonary

atelectatic changes, hepatic capsular calcification, tiny fat-containing right

inguinal hernia

 

The CT scanner at [facility] is accredited by the [employer]

[employer] and the scans are performed using protocols designed to limit radiation

exposure to as low as reasonably achievable to attain images of sufficient resolution

adequate for diagnostic evaluation.

## 2024-02-08 NOTE — ENDOSCOPY PROCEDURE NOTE
Endoscopy Procedure Note


General


Indication for Procedure:  gib


Procedures Performed:  EGD


Operative Findings/Diagnosis:  gastritis


Specimen:  yes


Pt Tolerated Procedure Well:  Yes


Estimated Blood Loss:  none





Anesthesia


Anesthesiologist:  colt


Anesthesia:  MAC





Inserted Devices


Implant(s) used?:  No





GI Core Measures


50 yrs or older w/o bx or poly:  Not Applicable


10yrs. F/U not recommended:  Not Applicable











Curtis Elizondo MD Nov 6, 2018 12:29
See Attestation